# Patient Record
Sex: FEMALE | Race: BLACK OR AFRICAN AMERICAN | NOT HISPANIC OR LATINO
[De-identification: names, ages, dates, MRNs, and addresses within clinical notes are randomized per-mention and may not be internally consistent; named-entity substitution may affect disease eponyms.]

---

## 2017-08-31 ENCOUNTER — APPOINTMENT (OUTPATIENT)
Dept: HEART AND VASCULAR | Facility: CLINIC | Age: 72
End: 2017-08-31
Payer: MEDICARE

## 2017-08-31 VITALS
WEIGHT: 179.06 LBS | RESPIRATION RATE: 14 BRPM | HEIGHT: 69 IN | BODY MASS INDEX: 26.52 KG/M2 | DIASTOLIC BLOOD PRESSURE: 60 MMHG | HEART RATE: 63 BPM | OXYGEN SATURATION: 96 % | SYSTOLIC BLOOD PRESSURE: 132 MMHG | TEMPERATURE: 98.1 F

## 2017-08-31 PROCEDURE — 99214 OFFICE O/P EST MOD 30 MIN: CPT | Mod: 25

## 2017-08-31 PROCEDURE — 93000 ELECTROCARDIOGRAM COMPLETE: CPT

## 2017-09-11 ENCOUNTER — OUTPATIENT (OUTPATIENT)
Dept: OUTPATIENT SERVICES | Facility: HOSPITAL | Age: 72
LOS: 1 days | End: 2017-09-11
Payer: MEDICARE

## 2017-09-11 PROCEDURE — 75574 CT ANGIO HRT W/3D IMAGE: CPT

## 2017-09-11 PROCEDURE — 75574 CT ANGIO HRT W/3D IMAGE: CPT | Mod: 26

## 2017-09-26 ENCOUNTER — APPOINTMENT (OUTPATIENT)
Dept: HEART AND VASCULAR | Facility: CLINIC | Age: 72
End: 2017-09-26
Payer: MEDICARE

## 2017-09-26 VITALS
HEIGHT: 69 IN | SYSTOLIC BLOOD PRESSURE: 131 MMHG | RESPIRATION RATE: 14 BRPM | OXYGEN SATURATION: 90 % | BODY MASS INDEX: 24.88 KG/M2 | TEMPERATURE: 97.6 F | DIASTOLIC BLOOD PRESSURE: 60 MMHG | HEART RATE: 70 BPM | WEIGHT: 168 LBS

## 2017-09-26 DIAGNOSIS — R91.1 SOLITARY PULMONARY NODULE: ICD-10-CM

## 2017-09-26 DIAGNOSIS — E11.9 TYPE 2 DIABETES MELLITUS W/OUT COMPLICATIONS: ICD-10-CM

## 2017-09-26 PROCEDURE — 99214 OFFICE O/P EST MOD 30 MIN: CPT

## 2017-09-26 RX ORDER — ASPIRIN 81 MG/1
81 TABLET ORAL DAILY
Qty: 30 | Refills: 5 | Status: ACTIVE | COMMUNITY
Start: 2017-09-26

## 2017-09-26 RX ORDER — METFORMIN HYDROCHLORIDE 1000 MG/1
1000 TABLET, COATED ORAL TWICE DAILY
Qty: 60 | Refills: 3 | Status: ACTIVE | COMMUNITY
Start: 2017-04-13

## 2018-11-02 ENCOUNTER — APPOINTMENT (OUTPATIENT)
Dept: CT IMAGING | Facility: HOSPITAL | Age: 73
End: 2018-11-02
Payer: MEDICARE

## 2018-11-02 ENCOUNTER — OUTPATIENT (OUTPATIENT)
Dept: OUTPATIENT SERVICES | Facility: HOSPITAL | Age: 73
LOS: 1 days | End: 2018-11-02
Payer: MEDICARE

## 2018-11-02 PROCEDURE — 71250 CT THORAX DX C-: CPT | Mod: 26

## 2018-11-02 PROCEDURE — 71250 CT THORAX DX C-: CPT

## 2019-04-04 ENCOUNTER — APPOINTMENT (OUTPATIENT)
Dept: HEART AND VASCULAR | Facility: CLINIC | Age: 74
End: 2019-04-04

## 2019-04-04 ENCOUNTER — APPOINTMENT (OUTPATIENT)
Dept: HEART AND VASCULAR | Facility: CLINIC | Age: 74
End: 2019-04-04
Payer: MEDICARE

## 2019-04-04 VITALS
HEART RATE: 43 BPM | TEMPERATURE: 97.4 F | HEIGHT: 69 IN | RESPIRATION RATE: 14 BRPM | OXYGEN SATURATION: 97 % | BODY MASS INDEX: 26.36 KG/M2 | WEIGHT: 178 LBS

## 2019-04-04 DIAGNOSIS — I51.7 CARDIOMEGALY: ICD-10-CM

## 2019-04-04 PROCEDURE — 93000 ELECTROCARDIOGRAM COMPLETE: CPT

## 2019-04-04 PROCEDURE — 93306 TTE W/DOPPLER COMPLETE: CPT

## 2019-04-04 PROCEDURE — 99214 OFFICE O/P EST MOD 30 MIN: CPT

## 2019-04-05 PROBLEM — I51.7 LVH (LEFT VENTRICULAR HYPERTROPHY): Status: ACTIVE | Noted: 2019-04-05

## 2019-04-05 NOTE — DISCUSSION/SUMMARY
[FreeTextEntry1] : At the time of the patient's visit an Echocardiogram was performed to evaluate her LV function.\par \par  At the time of the visit the results were reviewed with patient \par \par I suggested that she return for a Nuclear Stress Test

## 2019-04-05 NOTE — HISTORY OF PRESENT ILLNESS
[FreeTextEntry1] : 74 year female who has been feeling fatigue, palpitations and chest pain. Her fatigue worsened over the past months. She needs to rest with household chores. She feels that she has always had fatigue but now feels more tired. She describes that when her baseline fatigue moves to being tired she then feels her heart fluttering, chest pain and shortness of breath which can last for 5-10 minutes and improves with time.

## 2019-04-24 ENCOUNTER — APPOINTMENT (OUTPATIENT)
Dept: HEART AND VASCULAR | Facility: CLINIC | Age: 74
End: 2019-04-24
Payer: MEDICARE

## 2019-04-24 PROCEDURE — 99213 OFFICE O/P EST LOW 20 MIN: CPT

## 2019-04-24 NOTE — HISTORY OF PRESENT ILLNESS
[FreeTextEntry1] : 74 year female who reports being unable to walk on a treadmill and is concerned about asthma with Lexiscan. At the time of her visit I contacted Dr Perez, confirmed findings of lung CT of November 2018 and Nuclear stress test was cancelled. Arrangements made for coronary CTA with calcium at [facility] to rule out CAD as the cause of her SOB

## 2019-04-24 NOTE — PHYSICAL EXAM
[General Appearance - Well Developed] : well developed [Normal Appearance] : normal appearance [Well Groomed] : well groomed [General Appearance - Well Nourished] : well nourished [No Deformities] : no deformities [General Appearance - In No Acute Distress] : no acute distress [Normal Conjunctiva] : the conjunctiva exhibited no abnormalities [] : no respiratory distress [Respiration, Rhythm And Depth] : normal respiratory rhythm and effort [Exaggerated Use Of Accessory Muscles For Inspiration] : no accessory muscle use [Abnormal Walk] : normal gait [Skin Turgor] : normal skin turgor [Oriented To Time, Place, And Person] : oriented to person, place, and time [Affect] : the affect was normal [Mood] : the mood was normal [No Anxiety] : not feeling anxious

## 2019-05-02 ENCOUNTER — APPOINTMENT (OUTPATIENT)
Dept: CT IMAGING | Facility: HOSPITAL | Age: 74
End: 2019-05-02
Payer: MEDICARE

## 2019-05-02 ENCOUNTER — OUTPATIENT (OUTPATIENT)
Dept: OUTPATIENT SERVICES | Facility: HOSPITAL | Age: 74
LOS: 1 days | End: 2019-05-02
Payer: MEDICARE

## 2019-05-02 PROCEDURE — 75574 CT ANGIO HRT W/3D IMAGE: CPT

## 2019-05-02 PROCEDURE — 75574 CT ANGIO HRT W/3D IMAGE: CPT | Mod: 26

## 2019-06-06 ENCOUNTER — APPOINTMENT (OUTPATIENT)
Dept: SLEEP CENTER | Facility: HOSPITAL | Age: 74
End: 2019-06-06

## 2019-06-06 ENCOUNTER — OUTPATIENT (OUTPATIENT)
Dept: OUTPATIENT SERVICES | Facility: HOSPITAL | Age: 74
LOS: 1 days | End: 2019-06-06
Payer: MEDICARE

## 2019-06-06 DIAGNOSIS — G47.33 OBSTRUCTIVE SLEEP APNEA (ADULT) (PEDIATRIC): ICD-10-CM

## 2019-06-06 PROCEDURE — 95811 POLYSOM 6/>YRS CPAP 4/> PARM: CPT | Mod: 26

## 2019-06-06 PROCEDURE — 95811 POLYSOM 6/>YRS CPAP 4/> PARM: CPT

## 2019-06-12 ENCOUNTER — APPOINTMENT (OUTPATIENT)
Dept: HEART AND VASCULAR | Facility: CLINIC | Age: 74
End: 2019-06-12

## 2019-06-18 ENCOUNTER — APPOINTMENT (OUTPATIENT)
Dept: HEART AND VASCULAR | Facility: CLINIC | Age: 74
End: 2019-06-18
Payer: MEDICARE

## 2019-06-18 ENCOUNTER — APPOINTMENT (OUTPATIENT)
Dept: HEART AND VASCULAR | Facility: CLINIC | Age: 74
End: 2019-06-18

## 2019-06-18 DIAGNOSIS — R00.1 BRADYCARDIA, UNSPECIFIED: ICD-10-CM

## 2019-06-18 PROCEDURE — 93225 XTRNL ECG REC<48 HRS REC: CPT

## 2019-06-18 RX ORDER — LOSARTAN POTASSIUM AND HYDROCHLOROTHIAZIDE 12.5; 1 MG/1; MG/1
100-12.5 TABLET ORAL
Refills: 0 | Status: DISCONTINUED | COMMUNITY
End: 2019-06-18

## 2019-06-18 NOTE — HISTORY OF PRESENT ILLNESS
[FreeTextEntry1] : 74 year female who was noted to have sinus bradycardia on EKG with Dr Ball comes for Holter hookup

## 2019-07-29 ENCOUNTER — APPOINTMENT (OUTPATIENT)
Dept: HEART AND VASCULAR | Facility: CLINIC | Age: 74
End: 2019-07-29
Payer: MEDICARE

## 2019-07-29 VITALS
DIASTOLIC BLOOD PRESSURE: 70 MMHG | TEMPERATURE: 97.2 F | SYSTOLIC BLOOD PRESSURE: 120 MMHG | RESPIRATION RATE: 14 BRPM | OXYGEN SATURATION: 98 % | HEART RATE: 50 BPM

## 2019-07-29 DIAGNOSIS — R00.2 PALPITATIONS: ICD-10-CM

## 2019-07-29 DIAGNOSIS — R42 DIZZINESS AND GIDDINESS: ICD-10-CM

## 2019-07-29 PROCEDURE — 99214 OFFICE O/P EST MOD 30 MIN: CPT

## 2019-07-29 NOTE — DISCUSSION/SUMMARY
[FreeTextEntry1] : Arrangements were made for EP consultation and 30 day event recorder with Dr Gongora at [facility]. If severe symptoms, then to go to ER at [facility]

## 2019-07-29 NOTE — PHYSICAL EXAM
[General Appearance - Well Developed] : well developed [General Appearance - Well Nourished] : well nourished [Well Groomed] : well groomed [Normal Appearance] : normal appearance [No Deformities] : no deformities [General Appearance - In No Acute Distress] : no acute distress [Normal Conjunctiva] : the conjunctiva exhibited no abnormalities [Respiration, Rhythm And Depth] : normal respiratory rhythm and effort [] : no respiratory distress [Exaggerated Use Of Accessory Muscles For Inspiration] : no accessory muscle use [Abnormal Walk] : normal gait [Skin Turgor] : normal skin turgor [Affect] : the affect was normal [Oriented To Time, Place, And Person] : oriented to person, place, and time [No Anxiety] : not feeling anxious [Mood] : the mood was normal

## 2019-07-29 NOTE — HISTORY OF PRESENT ILLNESS
[FreeTextEntry1] : 74 year female who notes a sense of feeling very dizzy as if she could pass out. She describes it as lightheaded and sometimes the room spinning. She recalls having some when wearing the Holter. She reports inner ear issues but not usually associated with dizziness

## 2019-08-12 ENCOUNTER — APPOINTMENT (OUTPATIENT)
Age: 74
End: 2019-08-12
Payer: MEDICARE

## 2019-08-12 PROCEDURE — 0298T: CPT

## 2019-09-09 ENCOUNTER — NON-APPOINTMENT (OUTPATIENT)
Age: 74
End: 2019-09-09

## 2019-09-09 ENCOUNTER — APPOINTMENT (OUTPATIENT)
Dept: HEART AND VASCULAR | Facility: CLINIC | Age: 74
End: 2019-09-09
Payer: MEDICARE

## 2019-09-09 VITALS
HEART RATE: 46 BPM | DIASTOLIC BLOOD PRESSURE: 79 MMHG | SYSTOLIC BLOOD PRESSURE: 184 MMHG | BODY MASS INDEX: 25.92 KG/M2 | HEIGHT: 69 IN | WEIGHT: 175 LBS

## 2019-09-09 PROCEDURE — 93000 ELECTROCARDIOGRAM COMPLETE: CPT

## 2019-09-09 PROCEDURE — 99204 OFFICE O/P NEW MOD 45 MIN: CPT | Mod: 25

## 2019-09-10 NOTE — DISCUSSION/SUMMARY
[FreeTextEntry1] : Ms. Perez is a pleasant 74 year-old with recent ambulatory telemetry significant for junctional bradycardia during waking hours (~ 40 bpm) and brief episodes of pSVT.  Given her complaint of fatigue and episodic dizziness, we discussed the utility of pacemaker placement for management of her bradycardia.  The risks, benefits and alternatives to the procedure were discussed with her in great detail.  She would like some time to discuss this with her family and will call me back to schedule the procedure.  All questions were answered to her apparent satisfaction.

## 2019-09-10 NOTE — PHYSICAL EXAM
[General Appearance - Well Developed] : well developed [Normal Appearance] : normal appearance [Well Groomed] : well groomed [General Appearance - Well Nourished] : well nourished [No Deformities] : no deformities [General Appearance - In No Acute Distress] : no acute distress [Eyelids - No Xanthelasma] : the eyelids demonstrated no xanthelasmas [Normal Conjunctiva] : the conjunctiva exhibited no abnormalities [Normal Oral Mucosa] : normal oral mucosa [No Oral Pallor] : no oral pallor [No Oral Cyanosis] : no oral cyanosis [Normal Jugular Venous V Waves Present] : normal jugular venous V waves present [Normal Jugular Venous A Waves Present] : normal jugular venous A waves present [No Jugular Venous Chen A Waves] : no jugular venous chen A waves [5th Left ICS - MCL] : palpated at the 5th LICS in the midclavicular line [Normal] : normal [Bradycardia] : bradycardic [Rhythm Regular] : regular [No Murmur] : no murmurs heard [No Pitting Edema] : no pitting edema present [Respiration, Rhythm And Depth] : normal respiratory rhythm and effort [Auscultation Breath Sounds / Voice Sounds] : lungs were clear to auscultation bilaterally [Exaggerated Use Of Accessory Muscles For Inspiration] : no accessory muscle use [Abdomen Soft] : soft [Abdomen Tenderness] : non-tender [Abdomen Mass (___ Cm)] : no abdominal mass palpated [Gait - Sufficient For Exercise Testing] : the gait was sufficient for exercise testing [Abnormal Walk] : normal gait [Cyanosis, Localized] : no localized cyanosis [Nail Clubbing] : no clubbing of the fingernails [Petechial Hemorrhages (___cm)] : no petechial hemorrhages [Skin Color & Pigmentation] : normal skin color and pigmentation [] : no rash [Skin Lesions] : no skin lesions [No Venous Stasis] : no venous stasis [No Xanthoma] : no  xanthoma was observed [No Skin Ulcers] : no skin ulcer [Oriented To Time, Place, And Person] : oriented to person, place, and time [Affect] : the affect was normal [Mood] : the mood was normal [No Anxiety] : not feeling anxious

## 2019-09-10 NOTE — HISTORY OF PRESENT ILLNESS
[FreeTextEntry1] : Ms. Perez is a pleasant 74 year-old female with a past medical history significant for KYLE (compliant with BIPAP), ILD (followed by Dr. Perez), DM, HLD, HTN who presents for initial evaluation of dizziness and bradycardia.  \par \par Describes episodic dizziness that she describes as the room spinning- lasts less than one minute.  \par Reports HR was noted to be in the 30 bpm range when she presented for CTA - states HR improved to about 50 bpm and CT was performed.  Reports generalized fatigue that has progressed over the last several years.  Denies any presyncope or syncope.  Exercises in her bed - does 100 sit ups morning and night.  Aware of brief palpitations that last for a few seconds, not particularly bothersome to her.\par \par CTA 5/2/19 - nonobstructive CAD, LVEF 65%\par LTM (Zio XT) 7/30 - 8/12/19 SR (33-49-96), pSVT - longest 16 beats @ 112 bpm, symptomatic events within 45 seconds of SVT and junctional bradycardia\par Holter 6/2019 SR (37-52-90), longest R-R 2 seconds (after PAC), isolated PVCs, PACs\par ECHO 4/2019 LVEF 65%, mild LVH, trace to mild MR, LA 4.1 cm

## 2019-10-08 ENCOUNTER — OUTPATIENT (OUTPATIENT)
Dept: OUTPATIENT SERVICES | Facility: HOSPITAL | Age: 74
LOS: 1 days | Discharge: HOME | End: 2019-10-08
Payer: MEDICARE

## 2019-10-08 ENCOUNTER — FORM ENCOUNTER (OUTPATIENT)
Age: 74
End: 2019-10-08

## 2019-10-08 VITALS — HEIGHT: 69 IN | WEIGHT: 175.05 LBS

## 2019-10-08 DIAGNOSIS — Z98.890 OTHER SPECIFIED POSTPROCEDURAL STATES: Chronic | ICD-10-CM

## 2019-10-08 DIAGNOSIS — I10 ESSENTIAL (PRIMARY) HYPERTENSION: ICD-10-CM

## 2019-10-08 DIAGNOSIS — E11.9 TYPE 2 DIABETES MELLITUS WITHOUT COMPLICATIONS: ICD-10-CM

## 2019-10-08 DIAGNOSIS — R00.1 BRADYCARDIA, UNSPECIFIED: ICD-10-CM

## 2019-10-08 DIAGNOSIS — G47.33 OBSTRUCTIVE SLEEP APNEA (ADULT) (PEDIATRIC): ICD-10-CM

## 2019-10-08 LAB
GLUCOSE BLDC GLUCOMTR-MCNC: 143 MG/DL — HIGH (ref 70–99)
GLUCOSE BLDC GLUCOMTR-MCNC: 156 MG/DL — HIGH (ref 70–99)

## 2019-10-08 PROCEDURE — 33208 INSRT HEART PM ATRIAL & VENT: CPT | Mod: KX

## 2019-10-08 RX ORDER — DEXTROSE 50 % IN WATER 50 %
25 SYRINGE (ML) INTRAVENOUS ONCE
Refills: 0 | Status: DISCONTINUED | OUTPATIENT
Start: 2019-10-08 | End: 2019-10-09

## 2019-10-08 RX ORDER — MONTELUKAST 4 MG/1
1 TABLET, CHEWABLE ORAL
Qty: 0 | Refills: 0 | DISCHARGE

## 2019-10-08 RX ORDER — METFORMIN HYDROCHLORIDE 850 MG/1
1 TABLET ORAL
Qty: 0 | Refills: 0 | DISCHARGE

## 2019-10-08 RX ORDER — MONTELUKAST 4 MG/1
10 TABLET, CHEWABLE ORAL DAILY
Refills: 0 | Status: DISCONTINUED | OUTPATIENT
Start: 2019-10-08 | End: 2019-10-09

## 2019-10-08 RX ORDER — DEXTROSE 50 % IN WATER 50 %
15 SYRINGE (ML) INTRAVENOUS ONCE
Refills: 0 | Status: DISCONTINUED | OUTPATIENT
Start: 2019-10-08 | End: 2019-10-09

## 2019-10-08 RX ORDER — ASPIRIN/CALCIUM CARB/MAGNESIUM 324 MG
81 TABLET ORAL DAILY
Refills: 0 | Status: DISCONTINUED | OUTPATIENT
Start: 2019-10-08 | End: 2019-10-09

## 2019-10-08 RX ORDER — SIMVASTATIN 20 MG/1
1 TABLET, FILM COATED ORAL
Qty: 0 | Refills: 0 | DISCHARGE

## 2019-10-08 RX ORDER — ACETAMINOPHEN 500 MG
650 TABLET ORAL EVERY 4 HOURS
Refills: 0 | Status: DISCONTINUED | OUTPATIENT
Start: 2019-10-08 | End: 2019-10-09

## 2019-10-08 RX ORDER — EPINEPHRINE 0.3 MG/.3ML
0 INJECTION INTRAMUSCULAR; SUBCUTANEOUS
Qty: 0 | Refills: 0 | DISCHARGE

## 2019-10-08 RX ORDER — GLUCAGON INJECTION, SOLUTION 0.5 MG/.1ML
1 INJECTION, SOLUTION SUBCUTANEOUS ONCE
Refills: 0 | Status: DISCONTINUED | OUTPATIENT
Start: 2019-10-08 | End: 2019-10-09

## 2019-10-08 RX ORDER — ALBUTEROL 90 UG/1
0 AEROSOL, METERED ORAL
Qty: 0 | Refills: 0 | DISCHARGE

## 2019-10-08 RX ORDER — CEFAZOLIN SODIUM 1 G
2000 VIAL (EA) INJECTION ONCE
Refills: 0 | Status: COMPLETED | OUTPATIENT
Start: 2019-10-08 | End: 2019-10-08

## 2019-10-08 RX ORDER — CEFAZOLIN SODIUM 1 G
VIAL (EA) INJECTION
Refills: 0 | Status: DISCONTINUED | OUTPATIENT
Start: 2019-10-08 | End: 2019-10-08

## 2019-10-08 RX ORDER — CEFAZOLIN SODIUM 1 G
2000 VIAL (EA) INJECTION EVERY 8 HOURS
Refills: 0 | Status: DISCONTINUED | OUTPATIENT
Start: 2019-10-08 | End: 2019-10-08

## 2019-10-08 RX ORDER — SODIUM CHLORIDE 9 MG/ML
1000 INJECTION, SOLUTION INTRAVENOUS
Refills: 0 | Status: DISCONTINUED | OUTPATIENT
Start: 2019-10-08 | End: 2019-10-09

## 2019-10-08 RX ORDER — UMECLIDINIUM 62.5 UG/1
62.5 AEROSOL, POWDER ORAL
Qty: 0 | Refills: 0 | DISCHARGE

## 2019-10-08 RX ORDER — SIMVASTATIN 20 MG/1
40 TABLET, FILM COATED ORAL DAILY
Refills: 0 | Status: DISCONTINUED | OUTPATIENT
Start: 2019-10-08 | End: 2019-10-09

## 2019-10-08 RX ORDER — CEFAZOLIN SODIUM 1 G
2000 VIAL (EA) INJECTION EVERY 8 HOURS
Refills: 0 | Status: COMPLETED | OUTPATIENT
Start: 2019-10-08 | End: 2019-10-09

## 2019-10-08 RX ORDER — ALBUTEROL 90 UG/1
2 AEROSOL, METERED ORAL EVERY 4 HOURS
Refills: 0 | Status: DISCONTINUED | OUTPATIENT
Start: 2019-10-08 | End: 2019-10-09

## 2019-10-08 RX ORDER — INSULIN LISPRO 100/ML
VIAL (ML) SUBCUTANEOUS ONCE
Refills: 0 | Status: COMPLETED | OUTPATIENT
Start: 2019-10-08 | End: 2019-10-08

## 2019-10-08 RX ORDER — LOSARTAN POTASSIUM 100 MG/1
1 TABLET, FILM COATED ORAL
Qty: 0 | Refills: 0 | DISCHARGE

## 2019-10-08 RX ORDER — ASPIRIN/CALCIUM CARB/MAGNESIUM 324 MG
1 TABLET ORAL
Qty: 0 | Refills: 0 | DISCHARGE

## 2019-10-08 RX ORDER — LOSARTAN POTASSIUM 100 MG/1
50 TABLET, FILM COATED ORAL DAILY
Refills: 0 | Status: DISCONTINUED | OUTPATIENT
Start: 2019-10-08 | End: 2019-10-09

## 2019-10-08 RX ORDER — DEXTROSE 50 % IN WATER 50 %
12.5 SYRINGE (ML) INTRAVENOUS ONCE
Refills: 0 | Status: DISCONTINUED | OUTPATIENT
Start: 2019-10-08 | End: 2019-10-09

## 2019-10-08 RX ADMIN — Medication 2000 MILLIGRAM(S): at 09:10

## 2019-10-08 RX ADMIN — Medication 2000 MILLIGRAM(S): at 22:20

## 2019-10-08 RX ADMIN — LOSARTAN POTASSIUM 50 MILLIGRAM(S): 100 TABLET, FILM COATED ORAL at 18:57

## 2019-10-08 RX ADMIN — SIMVASTATIN 40 MILLIGRAM(S): 20 TABLET, FILM COATED ORAL at 22:20

## 2019-10-08 RX ADMIN — MONTELUKAST 10 MILLIGRAM(S): 4 TABLET, CHEWABLE ORAL at 22:20

## 2019-10-08 NOTE — H&P ADULT - HISTORY OF PRESENT ILLNESS
74 year-old female with a past medical history significant for KYLE (compliant with BIPAP), ILD (followed by Dr. Perez), DM, HLD, HTN, outpatient telemetry monitoring was significant for junctional bradycardia correlating with her symptoms of dizziness and fatigue who presents for pacemaker implantation.       Describes episodic dizziness that she describes as the room spinning- lasts less than one minute.    Reports HR was noted to be in the 30 bpm range when she presented for CTA - states HR improved to about 50 bpm and CT was performed.  Reports generalized fatigue that has progressed over the last several years.  Denies any presyncope or syncope.  Exercises in her bed - does 100 sit ups morning and night.  Aware of brief palpitations that last for a few seconds, not particularly bothersome to her.

## 2019-10-08 NOTE — H&P ADULT - ASSESSMENT
74 year-old female with a past medical history significant for KYLE (compliant with BIPAP), ILD (followed by Dr. Perez), DM, HLD, HTN, outpatient telemetry monitoring was significant for junctional bradycardia correlating with her symptoms of dizziness and fatigue who presents for pacemaker implantation.

## 2019-10-08 NOTE — H&P ADULT - NSICDXPASTMEDICALHX_GEN_ALL_CORE_FT
PAST MEDICAL HISTORY:  Anemia s/p blood transfusion    Essential hypertension HTN (hypertension)    Hyperlipidemia Hyperlipidemia    KYLE treated with BiPAP     Type 2 diabetes mellitus Diabetes

## 2019-10-08 NOTE — PROGRESS NOTE ADULT - SUBJECTIVE AND OBJECTIVE BOX
SHAILA LARA  [id number]    PROCEDURE:  Dual chamber PPM          INDICATION:  SSS        ELECTROPHYSIOLOGIST(S):  MD Rosa Gongora MD        FINDINGS:  - Successful dual chamber PPM placement      COMPLICATIONS:  None      RECOMMENDATIONS:  - Ancef x2 doses q8hrs  - CXR in the AM

## 2019-10-08 NOTE — H&P ADULT - NSICDXPASTSURGICALHX_GEN_ALL_CORE_FT
PAST SURGICAL HISTORY:  History of brain surgery     History of breast lump removal     Other postprocedural status S/P knee surgery    Status post total hysterectomy S/P hysterectomy

## 2019-10-09 ENCOUNTER — TRANSCRIPTION ENCOUNTER (OUTPATIENT)
Age: 74
End: 2019-10-09

## 2019-10-09 VITALS
RESPIRATION RATE: 18 BRPM | OXYGEN SATURATION: 98 % | DIASTOLIC BLOOD PRESSURE: 71 MMHG | SYSTOLIC BLOOD PRESSURE: 146 MMHG | HEART RATE: 64 BPM

## 2019-10-09 LAB — GLUCOSE BLDC GLUCOMTR-MCNC: 197 MG/DL — HIGH (ref 70–99)

## 2019-10-09 PROCEDURE — 71046 X-RAY EXAM CHEST 2 VIEWS: CPT

## 2019-10-09 PROCEDURE — 82962 GLUCOSE BLOOD TEST: CPT

## 2019-10-09 PROCEDURE — 71046 X-RAY EXAM CHEST 2 VIEWS: CPT | Mod: 26

## 2019-10-09 PROCEDURE — C1785: CPT

## 2019-10-09 PROCEDURE — 33208 INSRT HEART PM ATRIAL & VENT: CPT | Mod: KX

## 2019-10-09 PROCEDURE — C1894: CPT

## 2019-10-09 PROCEDURE — C1769: CPT

## 2019-10-09 PROCEDURE — C1898: CPT

## 2019-10-09 RX ORDER — INSULIN LISPRO 100/ML
VIAL (ML) SUBCUTANEOUS ONCE
Refills: 0 | Status: COMPLETED | OUTPATIENT
Start: 2019-10-09 | End: 2019-10-09

## 2019-10-09 RX ADMIN — Medication 1: at 12:48

## 2019-10-09 RX ADMIN — Medication 81 MILLIGRAM(S): at 12:18

## 2019-10-09 RX ADMIN — MONTELUKAST 10 MILLIGRAM(S): 4 TABLET, CHEWABLE ORAL at 12:18

## 2019-10-09 RX ADMIN — Medication 2000 MILLIGRAM(S): at 06:22

## 2019-10-09 RX ADMIN — SIMVASTATIN 40 MILLIGRAM(S): 20 TABLET, FILM COATED ORAL at 12:18

## 2019-10-09 RX ADMIN — LOSARTAN POTASSIUM 50 MILLIGRAM(S): 100 TABLET, FILM COATED ORAL at 06:22

## 2019-10-09 NOTE — DISCHARGE NOTE PROVIDER - NSDCCPTREATMENT_GEN_ALL_CORE_FT
PRINCIPAL PROCEDURE  Procedure: Insertion of dual chamber permanent pacemaker  Findings and Treatment:

## 2019-10-09 NOTE — DISCHARGE NOTE PROVIDER - NSDCFUADDINST_GEN_ALL_CORE_FT
Cardiac Device Implant Post Operative Instructions  - Do not touch the incision until it is completely healed.   - There is a glue dressing  covering your incision, which will start to peel off on its own over the next 2-3 weeks. Do not pick at or peel off the glue dressing.   - Bruising around the implant site or over the chest, side or arm near the incision is normal, and will take a few weeks to resolve.  -Do not lift the affected arm higher than 90 degrees (shoulder height) in any direction for 4 weeks.   - Do not push, pull or lift anything heavier than 10 lbs (about a gallon of milk) with the affected arm for 4 weeks.     - Do not apply soaps, creams, lotions, ointments or powders to the incision until it is completely healed.  - You may take a shower in 24 hours, and allow the water to run over the incision. However, do not submerge the incision in water: do not swim or soak in bath tubs, hot tubs, swimming pools, etc.   You should call the doctor if:   - You notice redness, drainage, swelling, increased tenderness, hot sensation around the incision, bleeding or incision edges pulling apart.  - Your temperature is greater than 100 degrees F for more than 24 hours.  - You notice swelling or bulging at the incision or around the device that was not there when you left the hospital or is increasing in size.  - You experience increased difficulty breathing.  - You notice new/worsening swelling in your legs and ankles.  - You faint or have dizzy spells.  - You have any questions or concerns regarding your device or the procedure.

## 2019-10-09 NOTE — DISCHARGE NOTE PROVIDER - NSDCCPCAREPLAN_GEN_ALL_CORE_FT
PRINCIPAL DISCHARGE DIAGNOSIS  Diagnosis: Symptomatic bradycardia  Assessment and Plan of Treatment:

## 2019-10-09 NOTE — PROGRESS NOTE ADULT - SUBJECTIVE AND OBJECTIVE BOX
EPS Device interrogation    Indication: post implantation    Device model: [employer] Edora 8 DR-T					    Implanting Physician: Wilber Gongora    Functioning Mode: DDD-CLS 60/130			    Underlying Rhythm: AP/VS    Pacemaker dependency: No    Battery status: MARLYN    Lead parameters:   				  RA lead:    Sense:      5.0 mV.              Threshold:      0.3 V at    0.5 ms.           Impedance:      526 ohms  RV lead:    Sense:      12.5 mV.              Threshold:      0.4 V at    0.5 ms.           Impedance:       643 ohms    Events/Alert: none    Parameter change: none	        Will discuss with EPS attending.

## 2019-10-09 NOTE — DISCHARGE NOTE NURSING/CASE MANAGEMENT/SOCIAL WORK - PATIENT PORTAL LINK FT
You can access the FollowMyHealth Patient Portal offered by [facility] by registering at the following website: [email or web address]. By joining [employer]’s FollowMyHealth portal, you will also be able to view your health information using other applications (apps) compatible with our system.

## 2019-10-09 NOTE — DISCHARGE NOTE PROVIDER - HOSPITAL COURSE
74 year-old female with a past medical history significant for KYLE (compliant with BIPAP), ILD (followed by Dr. Perez), DM, HLD, HTN, outpatient telemetry monitoring was significant for junctional bradycardia correlating with her symptoms of dizziness and fatigue presented for pacemaker implantation.       On Oct 8, 2019, pt underwent successful dual chamber PPM placement.    Post procedure course was uneventful.        Pt with mild complaints of incisional pain. Pt reports relief with tylenol. No other complaints.        Constitutional: No acute Distress    Psych: Normal affect, normal mood    Neuro: A/o x 3, No focal deficits    Neck: Supple, NO JVD    CVS: S1 S2, No M/R/G    Pulmonary: CTAB, Breathing unlabored, No Rhonchi/Rales/Wheezing    GI: Soft, Non -tender, +BS x 4 quads    Skin: No rash, warm and dry, no erythematous areas     MSK: 5/5 strength, normal range of motion, no swollen or erythematous    joints.        Initial dressing removed. Incision is well approximated, clean and dry. Dermabond intact. Mild erythema. Mild ecchymosis. No oozing, bleeding, or hematoma.    Device check - parameters within desired limits.    CXR, by my read, leads in good position, no ptx.

## 2019-10-09 NOTE — DISCHARGE NOTE PROVIDER - CARE PROVIDER_API CALL
Wilber Gongora)  Cardiac Electrophysiology; Cardiovascular Disease; Internal Medicine  [address] [place] [address]  Phone: [phone]  Fax: [phone]  Follow Up Time:

## 2019-11-11 ENCOUNTER — APPOINTMENT (OUTPATIENT)
Dept: HEART AND VASCULAR | Facility: CLINIC | Age: 74
End: 2019-11-11
Payer: MEDICARE

## 2019-11-11 VITALS
HEART RATE: 80 BPM | HEIGHT: 69 IN | SYSTOLIC BLOOD PRESSURE: 167 MMHG | WEIGHT: 171 LBS | DIASTOLIC BLOOD PRESSURE: 70 MMHG | BODY MASS INDEX: 25.33 KG/M2

## 2019-11-11 PROBLEM — G47.33 OBSTRUCTIVE SLEEP APNEA (ADULT) (PEDIATRIC): Chronic | Status: ACTIVE | Noted: 2019-10-08

## 2019-11-11 PROCEDURE — 93280 PM DEVICE PROGR EVAL DUAL: CPT

## 2019-11-11 PROCEDURE — 99024 POSTOP FOLLOW-UP VISIT: CPT

## 2019-11-11 NOTE — PHYSICAL EXAM
[General Appearance - Well Developed] : well developed [Normal Appearance] : normal appearance [Well Groomed] : well groomed [General Appearance - Well Nourished] : well nourished [No Deformities] : no deformities [General Appearance - In No Acute Distress] : no acute distress [Respiration, Rhythm And Depth] : normal respiratory rhythm and effort [Exaggerated Use Of Accessory Muscles For Inspiration] : no accessory muscle use [Abdomen Soft] : soft [Auscultation Breath Sounds / Voice Sounds] : lungs were clear to auscultation bilaterally [Abdomen Tenderness] : non-tender [Abdomen Mass (___ Cm)] : no abdominal mass palpated [Cyanosis, Localized] : no localized cyanosis [Nail Clubbing] : no clubbing of the fingernails [Petechial Hemorrhages (___cm)] : no petechial hemorrhages [Eyelids - No Xanthelasma] : the eyelids demonstrated no xanthelasmas [Normal Conjunctiva] : the conjunctiva exhibited no abnormalities [Normal Oral Mucosa] : normal oral mucosa [No Oral Pallor] : no oral pallor [Normal Jugular Venous A Waves Present] : normal jugular venous A waves present [No Oral Cyanosis] : no oral cyanosis [No Jugular Venous Chen A Waves] : no jugular venous chen A waves [5th Left ICS - MCL] : palpated at the 5th LICS in the midclavicular line [Normal Jugular Venous V Waves Present] : normal jugular venous V waves present [Rhythm Regular] : regular [Bradycardia] : bradycardic [Normal] : normal [No Murmur] : no murmurs heard [No Pitting Edema] : no pitting edema present [Abnormal Walk] : normal gait [Gait - Sufficient For Exercise Testing] : the gait was sufficient for exercise testing [Skin Color & Pigmentation] : normal skin color and pigmentation [] : no rash [No Venous Stasis] : no venous stasis [Skin Lesions] : no skin lesions [No Skin Ulcers] : no skin ulcer [No Xanthoma] : no  xanthoma was observed [Oriented To Time, Place, And Person] : oriented to person, place, and time [Affect] : the affect was normal [Mood] : the mood was normal [No Anxiety] : not feeling anxious

## 2019-11-15 NOTE — PROCEDURE
[No] : not [Pacemaker] : pacemaker [Sinus Bradycardia] : sinus bradycardia [DDD] : DDD [Normal] : The battery status is normal. [Lead Imp:  ___ohms] : lead impedance was [unfilled] ohms [Sensing Amplitude ___mv] : sensing amplitude was [unfilled] mv [___V @] : [unfilled] V [___ ms] : [unfilled] ms [Programmed for Longevity] : output reprogrammed for improved battery longevity [de-identified] : [place] [de-identified] : CLS [de-identified] : 10/8/19 [de-identified] : 60

## 2019-11-15 NOTE — HISTORY OF PRESENT ILLNESS
[FreeTextEntry1] : Ms. Perez is a pleasant 74 year-old female with a past medical history significant for KYLE (compliant with BIPAP), ILD (followed by Dr. 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  She presents for post procedure follow-up today.  \par \par She denies any recurrent dizziness post procedure.  She offers no acute complaints.  \par  Denies any presyncope or syncope.  Exercises in her bed - does 100 sit ups morning and night.  Aware of brief palpitations that last for a few seconds, not particularly bothersome to her.\par \par CTA 5/2/19 - nonobstructive CAD, LVEF 65%\par LTM (Zio XT) 7/30 - 8/12/19 SR (33-49-96), pSVT - longest 16 beats @ 112 bpm, symptomatic events within 45 seconds of SVT and junctional bradycardia\par Holter 6/2019 SR (37-52-90), longest R-R 2 seconds (after PAC), isolated PVCs, PACs\par ECHO 4/2019 LVEF 65%, mild LVH, trace to mild MR, LA 4.1 cm

## 2019-11-15 NOTE — DISCUSSION/SUMMARY
[FreeTextEntry1] : Ms. Perez is a pleasant 74 year-old with recent ambulatory telemetry significant for junctional bradycardia during waking hours (~ 40 bpm) and brief episodes of pSVT.  Now s/p PPM placement 10/8/19.  Her device is functioning appropriately as programmed and all measured data is WNL.  She is enrolled in remote monitoring and was asked to return for follow-up in six months.

## 2020-01-02 ENCOUNTER — APPOINTMENT (OUTPATIENT)
Dept: HEART AND VASCULAR | Facility: CLINIC | Age: 75
End: 2020-01-02
Payer: MEDICARE

## 2020-01-02 VITALS
HEIGHT: 69 IN | HEART RATE: 79 BPM | OXYGEN SATURATION: 98 % | SYSTOLIC BLOOD PRESSURE: 154 MMHG | BODY MASS INDEX: 26.66 KG/M2 | DIASTOLIC BLOOD PRESSURE: 72 MMHG | WEIGHT: 180 LBS | TEMPERATURE: 97.2 F | RESPIRATION RATE: 14 BRPM

## 2020-01-02 PROCEDURE — 93306 TTE W/DOPPLER COMPLETE: CPT

## 2020-01-02 PROCEDURE — 93000 ELECTROCARDIOGRAM COMPLETE: CPT

## 2020-01-02 PROCEDURE — 99214 OFFICE O/P EST MOD 30 MIN: CPT

## 2020-01-02 NOTE — DISCUSSION/SUMMARY
[FreeTextEntry1] : At the time of the patient's visit an Echocardiogram was performed to evaluate LV function. At the time of the visit the results were reviewed with patient \par \par Resume Cardizem ER at 120 mg daily and return in 4 weeks

## 2020-01-02 NOTE — HISTORY OF PRESENT ILLNESS
[FreeTextEntry1] : 74 year female who notes intermittent palpitations and chest pain that can occur at any time. She notes fatigue when making her bed. She is aware of an elevated BP starting prior to her PPM.

## 2020-01-02 NOTE — PHYSICAL EXAM
[General Appearance - Well Developed] : well developed [Normal Appearance] : normal appearance [Well Groomed] : well groomed [General Appearance - Well Nourished] : well nourished [No Deformities] : no deformities [Normal Conjunctiva] : the conjunctiva exhibited no abnormalities [General Appearance - In No Acute Distress] : no acute distress [Respiration, Rhythm And Depth] : normal respiratory rhythm and effort [] : no respiratory distress [Heart Sounds] : normal S1 and S2 [Auscultation Breath Sounds / Voice Sounds] : lungs were clear to auscultation bilaterally [Exaggerated Use Of Accessory Muscles For Inspiration] : no accessory muscle use [FreeTextEntry1] : no edema [Skin Turgor] : normal skin turgor [Abnormal Walk] : normal gait [Oriented To Time, Place, And Person] : oriented to person, place, and time [Affect] : the affect was normal [Mood] : the mood was normal [No Anxiety] : not feeling anxious

## 2020-01-31 ENCOUNTER — APPOINTMENT (OUTPATIENT)
Dept: HEART AND VASCULAR | Facility: CLINIC | Age: 75
End: 2020-01-31

## 2020-02-05 ENCOUNTER — APPOINTMENT (OUTPATIENT)
Dept: HEART AND VASCULAR | Facility: CLINIC | Age: 75
End: 2020-02-05
Payer: MEDICARE

## 2020-02-05 VITALS — WEIGHT: 175.05 LBS | HEIGHT: 69 IN

## 2020-02-05 VITALS
DIASTOLIC BLOOD PRESSURE: 76 MMHG | TEMPERATURE: 97.3 F | HEART RATE: 77 BPM | WEIGHT: 175 LBS | BODY MASS INDEX: 25.92 KG/M2 | HEIGHT: 69 IN | SYSTOLIC BLOOD PRESSURE: 150 MMHG | RESPIRATION RATE: 14 BRPM

## 2020-02-05 VITALS — SYSTOLIC BLOOD PRESSURE: 154 MMHG | DIASTOLIC BLOOD PRESSURE: 70 MMHG

## 2020-02-05 DIAGNOSIS — R07.89 OTHER CHEST PAIN: ICD-10-CM

## 2020-02-05 PROCEDURE — 93000 ELECTROCARDIOGRAM COMPLETE: CPT

## 2020-02-05 PROCEDURE — 99214 OFFICE O/P EST MOD 30 MIN: CPT

## 2020-02-05 PROCEDURE — 36415 COLL VENOUS BLD VENIPUNCTURE: CPT

## 2020-02-05 NOTE — H&P ADULT - NSICDXPASTMEDICALHX_GEN_ALL_CORE_FT
PAST MEDICAL HISTORY:  Anemia s/p blood transfusion; per patient resolved after B12 injections    Brain cancer     Cataract     Essential hypertension HTN (hypertension)    History of bradycardia s/p ppm    Hyperlipidemia Hyperlipidemia    NSVT (nonsustained ventricular tachycardia)     KYLE treated with BiPAP     Seizure disorder     Type 2 diabetes mellitus Diabetes PAST MEDICAL HISTORY:  Anemia s/p blood transfusion; per patient resolved after B12 injections    Asthma with COPD     Brain cancer     Cataract     Essential hypertension HTN (hypertension)    History of bradycardia s/p ppm    Hyperlipidemia Hyperlipidemia    Interstitial lung disease     NSVT (nonsustained ventricular tachycardia)     KYLE treated with BiPAP     Seizure disorder     Type 2 diabetes mellitus Diabetes

## 2020-02-05 NOTE — HISTORY OF PRESENT ILLNESS
[FreeTextEntry1] : 75 year female who continues to have SOB and chest pain. She reports seeing Pulmonary who felt that her pain was not explained by her lung disease. She had chest pain this morning with minimal activity. She has had pain when making her bed, walking up stairs or exercising. Her most severe episode was Monday.

## 2020-02-05 NOTE — H&P ADULT - RS GEN PE MLT RESP DETAILS PC
respirations non-labored/good air movement/airway patent/breath sounds equal/no rhonchi/no wheezes/no intercostal retractions/no rales

## 2020-02-05 NOTE — H&P ADULT - HISTORY OF PRESENT ILLNESS
Confirm meds - steroids sent to pharmacy    74 year-old female [employer] with a PMH significant for DM, HLD, HTN, KYLE (compliant with BiPAP with O2 - unknown quantity), asthma/COPD (only on O2 at night, has been to ER for asthma - no hospitalizations/intubation), ILD (followed by Dr. 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 Lehman with complaints of SOB and substernal "tight" 10/10 chest pain with minimal exertion (making her bed - had to stop and rest), and in the shower, dizziness/pre-syncopal, fatigue. She has had rest pain. She reports seeing Pulm who felt that her pain was not explained by her lung disease. Denies any palpitations, syncope, LE edema, orthopnea, PND, fever, chills, or melena. CCTA 5/2/19: Ca score 140 (LAD 65, LCX 60, RCA 15, LM 0) - 66 percentile DELVALLE; Minimal stneosis in the distal LM, pLAD, pLCX, mLCX, and p/mRCA, EF 65%. Per MD note, ECHO 4/2019 LVEF 65%, mild LVH, trace to mild MR, LA 4.1 cm.     Due to patient's risk factors, known CAD, and ccs class IV anginal symptoms, patient now presents to [facility] for Cardiac Catheterization with PCI as clinically indicated.      Cardiac cath 1/15/15: LMCA nl, LAD luminal irr, LCX nl, RCA nl, EF 60%, EDP 10. 74 year-old female [employer] with a PMH significant for DM, HLD, HTN, KYLE (compliant with BiPAP with O2 - unknown quantity), asthma/COPD (only on O2 at night, has been to ER for asthma - no hospitalizations/intubation), ILD (followed by Dr. 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 Lehman with complaints of SOB and substernal "tight" 10/10 chest pain with minimal exertion (making her bed - had to stop and rest), and in the shower, dizziness/pre-syncopal, fatigue. She has had rest pain. She reports seeing Pulm who felt that her pain was not explained by her lung disease. Denies any palpitations, syncope, LE edema, orthopnea, PND, fever, chills, or melena. CCTA 5/2/19: Ca score 140 (LAD 65, LCX 60, RCA 15, LM 0) - 66 percentile DELVALLE; Minimal stneosis in the distal LM, pLAD, pLCX, mLCX, and p/mRCA, EF 65%. Per MD note, ECHO 4/2019 LVEF 65%, mild LVH, trace to mild MR, LA 4.1 cm.     Due to patient's risk factors, known CAD, and ccs class IV anginal symptoms, patient now presents to [facility] for Cardiac Catheterization with PCI as clinically indicated.      Cardiac cath 1/15/15: LMCA nl, LAD luminal irr, LCX nl, RCA nl, EF 60%, EDP 10.

## 2020-02-05 NOTE — DISCUSSION/SUMMARY
[FreeTextEntry1] : DM, Chest pain and SOB--Arrangements were made for cardiac cath at [facility] tomorrow. If worsening of symptoms to go to ER

## 2020-02-05 NOTE — H&P ADULT - NSHPLABSRESULTS_GEN_ALL_CORE
13.0   8.22  )-----------( 342      ( 06 Feb 2020 07:38 )             40.1               PT/INR - ( 06 Feb 2020 07:38 )   PT: 10.6 sec;   INR: 0.93          PTT - ( 06 Feb 2020 07:38 )  PTT:31.9 sec              EKG:

## 2020-02-05 NOTE — H&P ADULT - ASSESSMENT
74 year-old female [employer] with a PMH significant for DM, HLD, HTN, KYLE (compliant with BiPAP with O2 - unknown quantity), asthma/COPD (only on O2 at night, has been to ER for asthma - no hospitalizations/intubation), ILD (followed by Dr. 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 for recommended cardiac catheterization with possible intervention if clinically indicated.    -ASA 3, Mallampati 3  -Pt compliant with home ASA 81mg, last dose this AM. Will load pt with Plavix 600mg prior to cath  -IVF Hydration NS @ 75cc/hr  -Given seafood allergy, pt took Prednisone 60mg PO at 6PM, 12AM and 6AM prior to cath, will give Benadryl 50mg IVP X 1 on call to cath lab table  -precath consented  -Risks & benefits of procedure and alternative therapy have been explained to the patient including but not limited to: allergic reaction, bleeding w/possible need for blood transfusion, infection, renal and vascular compromise, limb damage, arrhythmia, stroke, vessel dissection/perforation, Myocardial infarction, emergent CABG. Informed consent obtained and in chart.

## 2020-02-05 NOTE — PHYSICAL EXAM
[General Appearance - Well Developed] : well developed [Normal Appearance] : normal appearance [Well Groomed] : well groomed [General Appearance - Well Nourished] : well nourished [No Deformities] : no deformities [General Appearance - In No Acute Distress] : no acute distress [Normal Conjunctiva] : the conjunctiva exhibited no abnormalities [] : no respiratory distress [Respiration, Rhythm And Depth] : normal respiratory rhythm and effort [Exaggerated Use Of Accessory Muscles For Inspiration] : no accessory muscle use [Auscultation Breath Sounds / Voice Sounds] : lungs were clear to auscultation bilaterally [Heart Sounds] : normal S1 and S2 [Abdomen Soft] : soft [Abnormal Walk] : normal gait [FreeTextEntry1] : no edema [Oriented To Time, Place, And Person] : oriented to person, place, and time [Skin Turgor] : normal skin turgor [Mood] : the mood was normal [Affect] : the affect was normal [No Anxiety] : not feeling anxious

## 2020-02-05 NOTE — H&P ADULT - NSICDXPASTSURGICALHX_GEN_ALL_CORE_FT
PAST SURGICAL HISTORY:  History of brain surgery     History of breast lump removal     Other postprocedural status S/P knee surgery    Status post total hysterectomy S/P hysterectomy PAST SURGICAL HISTORY:  History of brain surgery 1997    History of breast lump removal b/l    Other postprocedural status S/P knee surgery    S/P placement of cardiac pacemaker 10/2018, dual chamber biotronik    Status post total hysterectomy S/P hysterectomy

## 2020-02-06 ENCOUNTER — OUTPATIENT (OUTPATIENT)
Dept: OUTPATIENT SERVICES | Facility: HOSPITAL | Age: 75
LOS: 1 days | Discharge: NURSING, REHAB OR LONG-TERM CARE | End: 2020-02-06
Payer: MEDICARE

## 2020-02-06 DIAGNOSIS — Z98.890 OTHER SPECIFIED POSTPROCEDURAL STATES: Chronic | ICD-10-CM

## 2020-02-06 DIAGNOSIS — Z95.0 PRESENCE OF CARDIAC PACEMAKER: Chronic | ICD-10-CM

## 2020-02-06 LAB
ALBUMIN SERPL ELPH-MCNC: 4.3 G/DL
ALBUMIN SERPL ELPH-MCNC: 4.3 G/DL — SIGNIFICANT CHANGE UP (ref 3.3–5)
ALP BLD-CCNC: 92 U/L
ALP SERPL-CCNC: 97 U/L — SIGNIFICANT CHANGE UP (ref 40–120)
ALT FLD-CCNC: 14 U/L — SIGNIFICANT CHANGE UP (ref 10–45)
ALT SERPL-CCNC: 13 U/L
ANION GAP SERPL CALC-SCNC: 12 MMOL/L
ANION GAP SERPL CALC-SCNC: 16 MMOL/L — SIGNIFICANT CHANGE UP (ref 5–17)
APTT BLD: 31.7 SEC
APTT BLD: 31.9 SEC — SIGNIFICANT CHANGE UP (ref 27.5–36.3)
AST SERPL-CCNC: 16 U/L
AST SERPL-CCNC: 19 U/L — SIGNIFICANT CHANGE UP (ref 10–40)
BASOPHILS # BLD AUTO: 0.01 K/UL — SIGNIFICANT CHANGE UP (ref 0–0.2)
BASOPHILS # BLD AUTO: 0.11 K/UL
BASOPHILS NFR BLD AUTO: 0.1 % — SIGNIFICANT CHANGE UP (ref 0–2)
BASOPHILS NFR BLD AUTO: 1.8 %
BILIRUB SERPL-MCNC: 0.3 MG/DL
BILIRUB SERPL-MCNC: 0.3 MG/DL — SIGNIFICANT CHANGE UP (ref 0.2–1.2)
BUN SERPL-MCNC: 13 MG/DL
BUN SERPL-MCNC: 14 MG/DL — SIGNIFICANT CHANGE UP (ref 7–23)
CALCIUM SERPL-MCNC: 10.2 MG/DL
CALCIUM SERPL-MCNC: 10.4 MG/DL — SIGNIFICANT CHANGE UP (ref 8.4–10.5)
CHLORIDE SERPL-SCNC: 102 MMOL/L
CHLORIDE SERPL-SCNC: 102 MMOL/L — SIGNIFICANT CHANGE UP (ref 96–108)
CHOLEST SERPL-MCNC: 201 MG/DL — HIGH (ref 10–199)
CK MB CFR SERPL CALC: 1.9 NG/ML — SIGNIFICANT CHANGE UP (ref 0–6.7)
CK SERPL-CCNC: 84 U/L — SIGNIFICANT CHANGE UP (ref 25–170)
CO2 SERPL-SCNC: 21 MMOL/L — LOW (ref 22–31)
CO2 SERPL-SCNC: 27 MMOL/L
CREAT SERPL-MCNC: 0.75 MG/DL — SIGNIFICANT CHANGE UP (ref 0.5–1.3)
CREAT SERPL-MCNC: 0.8 MG/DL
EOSINOPHIL # BLD AUTO: 0 K/UL — SIGNIFICANT CHANGE UP (ref 0–0.5)
EOSINOPHIL # BLD AUTO: 0.09 K/UL
EOSINOPHIL NFR BLD AUTO: 0 % — SIGNIFICANT CHANGE UP (ref 0–6)
EOSINOPHIL NFR BLD AUTO: 1.5 %
GLUCOSE BLDC GLUCOMTR-MCNC: 179 MG/DL — HIGH (ref 70–99)
GLUCOSE BLDC GLUCOMTR-MCNC: 213 MG/DL — HIGH (ref 70–99)
GLUCOSE SERPL-MCNC: 118 MG/DL
GLUCOSE SERPL-MCNC: 243 MG/DL — HIGH (ref 70–99)
HBA1C BLD-MCNC: 7.4 % — HIGH (ref 4–5.6)
HCT VFR BLD CALC: 40 %
HCT VFR BLD CALC: 40.1 % — SIGNIFICANT CHANGE UP (ref 34.5–45)
HDLC SERPL-MCNC: 96 MG/DL — SIGNIFICANT CHANGE UP
HGB BLD-MCNC: 12.5 G/DL
HGB BLD-MCNC: 13 G/DL — SIGNIFICANT CHANGE UP (ref 11.5–15.5)
IMM GRANULOCYTES NFR BLD AUTO: 0.2 %
IMM GRANULOCYTES NFR BLD AUTO: 0.2 % — SIGNIFICANT CHANGE UP (ref 0–1.5)
INR BLD: 0.93 — SIGNIFICANT CHANGE UP (ref 0.88–1.16)
INR PPP: 0.93 RATIO
LIPID PNL WITH DIRECT LDL SERPL: 93 MG/DL — SIGNIFICANT CHANGE UP
LYMPHOCYTES # BLD AUTO: 1.05 K/UL — SIGNIFICANT CHANGE UP (ref 1–3.3)
LYMPHOCYTES # BLD AUTO: 12.8 % — LOW (ref 13–44)
LYMPHOCYTES # BLD AUTO: 2.15 K/UL
LYMPHOCYTES NFR BLD AUTO: 34.8 %
MAN DIFF?: NORMAL
MCHC RBC-ENTMCNC: 29.1 PG
MCHC RBC-ENTMCNC: 29.5 PG — SIGNIFICANT CHANGE UP (ref 27–34)
MCHC RBC-ENTMCNC: 31.3 GM/DL
MCHC RBC-ENTMCNC: 32.4 GM/DL — SIGNIFICANT CHANGE UP (ref 32–36)
MCV RBC AUTO: 90.9 FL — SIGNIFICANT CHANGE UP (ref 80–100)
MCV RBC AUTO: 93.2 FL
MONOCYTES # BLD AUTO: 0.03 K/UL — SIGNIFICANT CHANGE UP (ref 0–0.9)
MONOCYTES # BLD AUTO: 0.33 K/UL
MONOCYTES NFR BLD AUTO: 0.4 % — LOW (ref 2–14)
MONOCYTES NFR BLD AUTO: 5.3 %
NEUTROPHILS # BLD AUTO: 3.48 K/UL
NEUTROPHILS # BLD AUTO: 7.11 K/UL — SIGNIFICANT CHANGE UP (ref 1.8–7.4)
NEUTROPHILS NFR BLD AUTO: 56.4 %
NEUTROPHILS NFR BLD AUTO: 86.5 % — HIGH (ref 43–77)
NRBC # BLD: 0 /100 WBCS — SIGNIFICANT CHANGE UP (ref 0–0)
PLATELET # BLD AUTO: 337 K/UL
PLATELET # BLD AUTO: 342 K/UL — SIGNIFICANT CHANGE UP (ref 150–400)
POTASSIUM SERPL-MCNC: 4.3 MMOL/L — SIGNIFICANT CHANGE UP (ref 3.5–5.3)
POTASSIUM SERPL-SCNC: 4.3 MMOL/L — SIGNIFICANT CHANGE UP (ref 3.5–5.3)
POTASSIUM SERPL-SCNC: 4.6 MMOL/L
PROT SERPL-MCNC: 6.9 G/DL
PROT SERPL-MCNC: 7.7 G/DL — SIGNIFICANT CHANGE UP (ref 6–8.3)
PROTHROM AB SERPL-ACNC: 10.6 SEC — SIGNIFICANT CHANGE UP (ref 10–12.9)
PT BLD: 10.7 SEC
RBC # BLD: 4.29 M/UL
RBC # BLD: 4.41 M/UL — SIGNIFICANT CHANGE UP (ref 3.8–5.2)
RBC # FLD: 13.3 % — SIGNIFICANT CHANGE UP (ref 10.3–14.5)
RBC # FLD: 14.1 %
SODIUM SERPL-SCNC: 139 MMOL/L — SIGNIFICANT CHANGE UP (ref 135–145)
SODIUM SERPL-SCNC: 141 MMOL/L
TOTAL CHOLESTEROL/HDL RATIO MEASUREMENT: 2.1 RATIO — LOW (ref 3.3–7.1)
TRIGL SERPL-MCNC: 62 MG/DL — SIGNIFICANT CHANGE UP (ref 10–149)
WBC # BLD: 8.22 K/UL — SIGNIFICANT CHANGE UP (ref 3.8–10.5)
WBC # FLD AUTO: 6.17 K/UL
WBC # FLD AUTO: 8.22 K/UL — SIGNIFICANT CHANGE UP (ref 3.8–10.5)

## 2020-02-06 PROCEDURE — C1760: CPT

## 2020-02-06 PROCEDURE — C1769: CPT

## 2020-02-06 PROCEDURE — 80061 LIPID PANEL: CPT

## 2020-02-06 PROCEDURE — 85610 PROTHROMBIN TIME: CPT

## 2020-02-06 PROCEDURE — 93458 L HRT ARTERY/VENTRICLE ANGIO: CPT

## 2020-02-06 PROCEDURE — 80053 COMPREHEN METABOLIC PANEL: CPT

## 2020-02-06 PROCEDURE — 85025 COMPLETE CBC W/AUTO DIFF WBC: CPT

## 2020-02-06 PROCEDURE — 83036 HEMOGLOBIN GLYCOSYLATED A1C: CPT

## 2020-02-06 PROCEDURE — 93458 L HRT ARTERY/VENTRICLE ANGIO: CPT | Mod: 26

## 2020-02-06 PROCEDURE — 82553 CREATINE MB FRACTION: CPT

## 2020-02-06 PROCEDURE — 93010 ELECTROCARDIOGRAM REPORT: CPT

## 2020-02-06 PROCEDURE — 85730 THROMBOPLASTIN TIME PARTIAL: CPT

## 2020-02-06 PROCEDURE — 93005 ELECTROCARDIOGRAM TRACING: CPT

## 2020-02-06 PROCEDURE — C1887: CPT

## 2020-02-06 PROCEDURE — 36415 COLL VENOUS BLD VENIPUNCTURE: CPT

## 2020-02-06 PROCEDURE — 82962 GLUCOSE BLOOD TEST: CPT

## 2020-02-06 PROCEDURE — 82550 ASSAY OF CK (CPK): CPT

## 2020-02-06 PROCEDURE — C1894: CPT

## 2020-02-06 RX ORDER — DIPHENHYDRAMINE HCL 50 MG
50 CAPSULE ORAL ONCE
Refills: 0 | Status: COMPLETED | OUTPATIENT
Start: 2020-02-06 | End: 2020-02-06

## 2020-02-06 RX ORDER — DEXTROSE 50 % IN WATER 50 %
15 SYRINGE (ML) INTRAVENOUS ONCE
Refills: 0 | Status: DISCONTINUED | OUTPATIENT
Start: 2020-02-06 | End: 2020-02-06

## 2020-02-06 RX ORDER — GLUCAGON INJECTION, SOLUTION 0.5 MG/.1ML
1 INJECTION, SOLUTION SUBCUTANEOUS ONCE
Refills: 0 | Status: DISCONTINUED | OUTPATIENT
Start: 2020-02-06 | End: 2020-02-06

## 2020-02-06 RX ORDER — INSULIN LISPRO 100/ML
VIAL (ML) SUBCUTANEOUS ONCE
Refills: 0 | Status: COMPLETED | OUTPATIENT
Start: 2020-02-06 | End: 2020-02-06

## 2020-02-06 RX ORDER — DEXTROSE 50 % IN WATER 50 %
12.5 SYRINGE (ML) INTRAVENOUS ONCE
Refills: 0 | Status: DISCONTINUED | OUTPATIENT
Start: 2020-02-06 | End: 2020-02-06

## 2020-02-06 RX ORDER — DEXTROSE 50 % IN WATER 50 %
25 SYRINGE (ML) INTRAVENOUS ONCE
Refills: 0 | Status: DISCONTINUED | OUTPATIENT
Start: 2020-02-06 | End: 2020-02-06

## 2020-02-06 RX ORDER — SODIUM CHLORIDE 9 MG/ML
500 INJECTION INTRAMUSCULAR; INTRAVENOUS; SUBCUTANEOUS
Refills: 0 | Status: DISCONTINUED | OUTPATIENT
Start: 2020-02-06 | End: 2020-02-06

## 2020-02-06 RX ORDER — SODIUM CHLORIDE 9 MG/ML
1000 INJECTION, SOLUTION INTRAVENOUS
Refills: 0 | Status: DISCONTINUED | OUTPATIENT
Start: 2020-02-06 | End: 2020-02-06

## 2020-02-06 RX ORDER — CHLORHEXIDINE GLUCONATE 213 G/1000ML
1 SOLUTION TOPICAL ONCE
Refills: 0 | Status: DISCONTINUED | OUTPATIENT
Start: 2020-02-06 | End: 2020-02-06

## 2020-02-06 RX ORDER — CLOPIDOGREL BISULFATE 75 MG/1
600 TABLET, FILM COATED ORAL ONCE
Refills: 0 | Status: COMPLETED | OUTPATIENT
Start: 2020-02-06 | End: 2020-02-06

## 2020-02-06 RX ADMIN — Medication 50 MILLIGRAM(S): at 08:04

## 2020-02-06 RX ADMIN — CLOPIDOGREL BISULFATE 600 MILLIGRAM(S): 75 TABLET, FILM COATED ORAL at 07:59

## 2020-02-06 RX ADMIN — SODIUM CHLORIDE 75 MILLILITER(S): 9 INJECTION INTRAMUSCULAR; INTRAVENOUS; SUBCUTANEOUS at 08:01

## 2020-02-06 RX ADMIN — Medication 2: at 08:05

## 2020-02-06 NOTE — PROGRESS NOTE ADULT - SUBJECTIVE AND OBJECTIVE BOX
Procedure: LHC, Angioseal  Indication: UA, CAD, +CTA  Complication: none    Result:  1) Non-obstructive CAD  2) Normal LV function, EF 60%, LVEDP 8    Plan: Medical management.  D/C home - to f/u with Dr. Lehman.

## 2020-02-06 NOTE — PROGRESS NOTE ADULT - SUBJECTIVE AND OBJECTIVE BOX
Interventional Cardiology PA SDA Discharge Note    Patient without complaints. Ambulated and voided without difficulties    Afebrile, VSS    Ext:    		Right  Groin:  no hematoma, no  bruit, dressing; C/D/I      Pulses:    intact DP/PT to baseline     A/P:  74 year-old female [employer] with a PMH significant for DM, HLD, HTN, KYLE (compliant with BiPAP with O2 - unknown quantity), asthma/COPD (only on O2 at night, has been to ER for asthma - no hospitalizations/intubation), ILD (followed by Dr. 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 Lehman with complaints of SOB and substernal "tight" 10/10 chest pain with minimal exertion (making her bed - had to stop and rest), and in the shower, dizziness/pre-syncopal, fatigue. She has had rest pain. She reports seeing Pulm who felt that her pain was not explained by her lung disease. Denies any palpitations, syncope, LE edema, orthopnea, PND, fever, chills, or melena. CCTA 5/2/19: Ca score 140 (LAD 65, LCX 60, RCA 15, LM 0) - 66 percentile DELVALLE; Minimal stneosis in the distal LM, pLAD, pLCX, mLCX, and p/mRCA, EF 65%. Per MD note, ECHO 4/2019 LVEF 65%, mild LVH, trace to mild MR, LA 4.1 cm. Due to patient's risk factors, known CAD, and ccs class IV anginal symptoms, patient now presents to [facility] for Cardiac Catheterization with PCI as clinically indicated.      Pt now s/p diagnostic cardiac cath on 02/06/2020 revealing non-obstructive CAD. Normal LV function, EF 60%, LVEDP 8. Right groin AS.         1.	Stable for discharge as per attending Dr. Danielson after bed rest, pt voids, groin/wrist stable and 30 minutes of ambulation.  2.	Follow-up with PMD/Cardiologist Dr Lehman in 1-2 weeks  3.	Discharged forms signed and copies in chart

## 2020-02-10 ENCOUNTER — APPOINTMENT (OUTPATIENT)
Dept: HEART AND VASCULAR | Facility: CLINIC | Age: 75
End: 2020-02-10
Payer: MEDICARE

## 2020-02-10 VITALS
BODY MASS INDEX: 34.47 KG/M2 | WEIGHT: 175.56 LBS | DIASTOLIC BLOOD PRESSURE: 80 MMHG | HEART RATE: 84 BPM | SYSTOLIC BLOOD PRESSURE: 170 MMHG | HEIGHT: 60 IN

## 2020-02-10 PROBLEM — J84.9 INTERSTITIAL PULMONARY DISEASE, UNSPECIFIED: Chronic | Status: ACTIVE | Noted: 2020-02-05

## 2020-02-10 PROBLEM — J44.9 CHRONIC OBSTRUCTIVE PULMONARY DISEASE, UNSPECIFIED: Chronic | Status: ACTIVE | Noted: 2020-02-05

## 2020-02-10 PROBLEM — I47.2 VENTRICULAR TACHYCARDIA: Chronic | Status: ACTIVE | Noted: 2020-02-05

## 2020-02-10 PROBLEM — H26.9 UNSPECIFIED CATARACT: Chronic | Status: ACTIVE | Noted: 2020-02-05

## 2020-02-10 PROBLEM — C71.9 MALIGNANT NEOPLASM OF BRAIN, UNSPECIFIED: Chronic | Status: ACTIVE | Noted: 2020-02-05

## 2020-02-10 PROBLEM — G40.909 EPILEPSY, UNSPECIFIED, NOT INTRACTABLE, WITHOUT STATUS EPILEPTICUS: Chronic | Status: ACTIVE | Noted: 2020-02-05

## 2020-02-10 PROBLEM — Z86.79 PERSONAL HISTORY OF OTHER DISEASES OF THE CIRCULATORY SYSTEM: Chronic | Status: ACTIVE | Noted: 2020-02-05

## 2020-02-10 PROCEDURE — 93280 PM DEVICE PROGR EVAL DUAL: CPT

## 2020-02-10 NOTE — PHYSICAL EXAM
[Well Groomed] : well groomed [General Appearance - Well Developed] : well developed [Normal Appearance] : normal appearance [General Appearance - Well Nourished] : well nourished [No Deformities] : no deformities [General Appearance - In No Acute Distress] : no acute distress [Respiration, Rhythm And Depth] : normal respiratory rhythm and effort [Exaggerated Use Of Accessory Muscles For Inspiration] : no accessory muscle use [Auscultation Breath Sounds / Voice Sounds] : lungs were clear to auscultation bilaterally [Abdomen Soft] : soft [Abdomen Tenderness] : non-tender [Abdomen Mass (___ Cm)] : no abdominal mass palpated [Nail Clubbing] : no clubbing of the fingernails [Cyanosis, Localized] : no localized cyanosis [Petechial Hemorrhages (___cm)] : no petechial hemorrhages [Normal Conjunctiva] : the conjunctiva exhibited no abnormalities [Normal Oral Mucosa] : normal oral mucosa [Eyelids - No Xanthelasma] : the eyelids demonstrated no xanthelasmas [No Oral Pallor] : no oral pallor [No Oral Cyanosis] : no oral cyanosis [Normal Jugular Venous V Waves Present] : normal jugular venous V waves present [No Jugular Venous Chen A Waves] : no jugular venous chen A waves [Normal Jugular Venous A Waves Present] : normal jugular venous A waves present [5th Left ICS - MCL] : palpated at the 5th LICS in the midclavicular line [Normal] : normal [Rhythm Regular] : regular [Bradycardia] : bradycardic [No Pitting Edema] : no pitting edema present [No Murmur] : no murmurs heard [Gait - Sufficient For Exercise Testing] : the gait was sufficient for exercise testing [Abnormal Walk] : normal gait [] : no rash [Skin Color & Pigmentation] : normal skin color and pigmentation [No Venous Stasis] : no venous stasis [Skin Lesions] : no skin lesions [No Xanthoma] : no  xanthoma was observed [Oriented To Time, Place, And Person] : oriented to person, place, and time [No Skin Ulcers] : no skin ulcer [Mood] : the mood was normal [Affect] : the affect was normal [No Anxiety] : not feeling anxious

## 2020-02-11 ENCOUNTER — APPOINTMENT (OUTPATIENT)
Dept: HEART AND VASCULAR | Facility: CLINIC | Age: 75
End: 2020-02-11
Payer: MEDICARE

## 2020-02-11 PROCEDURE — 93296 REM INTERROG EVL PM/IDS: CPT

## 2020-02-11 PROCEDURE — 93294 REM INTERROG EVL PM/LDLS PM: CPT

## 2020-02-11 PROCEDURE — G2066: CPT

## 2020-02-11 NOTE — DISCUSSION/SUMMARY
[FreeTextEntry1] : Ms. Perez is a pleasant 75 year-old with recent ambulatory telemetry significant for junctional bradycardia during waking hours (~ 40 bpm) and brief episodes of pSVT.  Now s/p PPM placement 10/8/19.  Her device is functioning appropriately as programmed and all measured data is WNL.  No events for review.  She is enrolled in remote monitoring and was asked to return for follow-up in six months.

## 2020-02-11 NOTE — PROCEDURE
[No] : not [Sinus Bradycardia] : sinus bradycardia [Pacemaker] : pacemaker [DDD] : DDD [Normal] : The battery status is normal. [Lead Imp:  ___ohms] : lead impedance was [unfilled] ohms [Sensing Amplitude ___mv] : sensing amplitude was [unfilled] mv [___V @] : [unfilled] V [___ ms] : [unfilled] ms [de-identified] : [employer] [de-identified] : 10/8/19 [de-identified] : CLS [de-identified] : 60 [de-identified] : No events\par AP 95\par  0

## 2020-02-11 NOTE — HISTORY OF PRESENT ILLNESS
[FreeTextEntry1] : Ms. Perez is a pleasant 75 year-old female with a past medical history significant for KYLE (compliant with BIPAP), ILD (followed by Dr. 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  She presents for routine follow-up today.  She denies any recurrent dizziness post procedure.  Denies any presyncope or syncope.  Exercises in her bed - does 100 sit ups morning and night.  She complains of longstanding (years) of SOB with some ADLs such as making her bed that is unchanged recently.\par \par CTA 5/2/19 - nonobstructive CAD, LVEF 65%\par LTM (Zio XT) 7/30 - 8/12/19 SR (33-49-96), pSVT - longest 16 beats @ 112 bpm, symptomatic events within 45 seconds of SVT and junctional bradycardia\par Holter 6/2019 SR (37-52-90), longest R-R 2 seconds (after PAC), isolated PVCs, PACs\par ECHO 4/2019 LVEF 65%, mild LVH, trace to mild MR, LA 4.1 cm

## 2020-04-02 ENCOUNTER — APPOINTMENT (OUTPATIENT)
Dept: HEART AND VASCULAR | Facility: CLINIC | Age: 75
End: 2020-04-02

## 2020-05-05 ENCOUNTER — APPOINTMENT (OUTPATIENT)
Dept: HEART AND VASCULAR | Facility: CLINIC | Age: 75
End: 2020-05-05

## 2020-08-10 ENCOUNTER — APPOINTMENT (OUTPATIENT)
Dept: HEART AND VASCULAR | Facility: CLINIC | Age: 75
End: 2020-08-10
Payer: MEDICARE

## 2020-08-10 ENCOUNTER — APPOINTMENT (OUTPATIENT)
Dept: HEART AND VASCULAR | Facility: CLINIC | Age: 75
End: 2020-08-10

## 2020-08-10 PROCEDURE — 93280 PM DEVICE PROGR EVAL DUAL: CPT

## 2020-08-11 ENCOUNTER — APPOINTMENT (OUTPATIENT)
Dept: HEART AND VASCULAR | Facility: CLINIC | Age: 75
End: 2020-08-11
Payer: MEDICARE

## 2020-08-11 PROCEDURE — G2066: CPT

## 2020-08-11 PROCEDURE — 93296 REM INTERROG EVL PM/IDS: CPT

## 2020-08-11 PROCEDURE — 93294 REM INTERROG EVL PM/LDLS PM: CPT

## 2020-08-16 NOTE — REASON FOR VISIT
[Home] : at home, [unfilled] , at the time of the visit. [Medical Office: ([facility])___] : at the medical office located in  [Verbal consent obtained from patient] : the patient, [unfilled] [Follow-up Device Check] : follow-up device check visit

## 2020-08-16 NOTE — PROCEDURE
[No] : not [Sinus Bradycardia] : sinus bradycardia [Pacemaker] : pacemaker [DDD] : DDD [Normal] : The battery status is normal. [Lead Imp:  ___ohms] : lead impedance was [unfilled] ohms [Sensing Amplitude ___mv] : sensing amplitude was [unfilled] mv [___V @] : [unfilled] V [___ ms] : [unfilled] ms [Programmed for Longevity] : output reprogrammed for improved battery longevity [de-identified] : [employer] [de-identified] : 10/8/19 [de-identified] : CLS [de-identified] : 60

## 2020-08-16 NOTE — DISCUSSION/SUMMARY
[FreeTextEntry1] : Ms. Perez is a pleasant 75 year-old with recent ambulatory telemetry significant for junctional bradycardia during waking hours (~ 40 bpm) and brief episodes of pSVT.  Now s/p PPM placement 10/8/19.  Her device is functioning appropriately as programmed and all measured data is WNL.  She is enrolled in remote monitoring and was asked to return for follow-up in six months.

## 2020-08-16 NOTE — HISTORY OF PRESENT ILLNESS
[FreeTextEntry1] : Ms. Perez is a pleasant 75 year-old female with a past medical history significant for KYLE (compliant with BIPAP), ILD (followed by Dr. 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  She presents for follow-up today.  \par \par She denies any recurrent dizziness post procedure.  She offers no acute complaints.  \par  Denies any presyncope or syncope.  Exercises in her bed - does 100 sit ups morning and night.  Aware of brief palpitations that last for a few seconds, not particularly bothersome to her.\par \par CTA 5/2/19 - nonobstructive CAD, LVEF 65%\par LTM (Zio XT) 7/30 - 8/12/19 SR (33-49-96), pSVT - longest 16 beats @ 112 bpm, symptomatic events within 45 seconds of SVT and junctional bradycardia\par Holter 6/2019 SR (37-52-90), longest R-R 2 seconds (after PAC), isolated PVCs, PACs\par ECHO 4/2019 LVEF 65%, mild LVH, trace to mild MR, LA 4.1 cm

## 2020-11-04 ENCOUNTER — APPOINTMENT (OUTPATIENT)
Dept: HEART AND VASCULAR | Facility: CLINIC | Age: 75
End: 2020-11-04
Payer: MEDICARE

## 2020-11-04 VITALS
WEIGHT: 175 LBS | HEIGHT: 60 IN | TEMPERATURE: 97.4 F | SYSTOLIC BLOOD PRESSURE: 170 MMHG | OXYGEN SATURATION: 97 % | BODY MASS INDEX: 34.36 KG/M2 | RESPIRATION RATE: 14 BRPM | HEART RATE: 71 BPM | DIASTOLIC BLOOD PRESSURE: 82 MMHG

## 2020-11-04 DIAGNOSIS — R00.2 PALPITATIONS: ICD-10-CM

## 2020-11-04 DIAGNOSIS — G47.33 OBSTRUCTIVE SLEEP APNEA (ADULT) (PEDIATRIC): ICD-10-CM

## 2020-11-04 PROCEDURE — 99214 OFFICE O/P EST MOD 30 MIN: CPT

## 2020-11-04 PROCEDURE — 93000 ELECTROCARDIOGRAM COMPLETE: CPT

## 2020-11-04 NOTE — HISTORY OF PRESENT ILLNESS
[FreeTextEntry1] : 75 year female who saw Dr Perez recently who noted an enlarged heart on a CXR. She describes using CPAP and denies having any change in her pattern of her chest pain and SOB. We reviewed her cath results of February 2020. She denies having any difficulty lying flat, PND or orthopnea

## 2020-11-18 ENCOUNTER — APPOINTMENT (OUTPATIENT)
Dept: HEART AND VASCULAR | Facility: CLINIC | Age: 75
End: 2020-11-18
Payer: MEDICARE

## 2020-11-18 PROCEDURE — G2066: CPT

## 2020-11-18 PROCEDURE — 93294 REM INTERROG EVL PM/LDLS PM: CPT

## 2020-11-18 PROCEDURE — 93296 REM INTERROG EVL PM/IDS: CPT

## 2021-01-28 ENCOUNTER — APPOINTMENT (OUTPATIENT)
Dept: HEART AND VASCULAR | Facility: CLINIC | Age: 76
End: 2021-01-28

## 2021-02-02 ENCOUNTER — APPOINTMENT (OUTPATIENT)
Dept: HEART AND VASCULAR | Facility: CLINIC | Age: 76
End: 2021-02-02

## 2021-03-08 ENCOUNTER — APPOINTMENT (OUTPATIENT)
Dept: HEART AND VASCULAR | Facility: CLINIC | Age: 76
End: 2021-03-08
Payer: MEDICARE

## 2021-03-08 PROCEDURE — 93280 PM DEVICE PROGR EVAL DUAL: CPT

## 2021-03-11 NOTE — PROCEDURE
[No] : not [Sinus Bradycardia] : sinus bradycardia [Pacemaker] : pacemaker [DDD] : DDD [Normal] : The battery status is normal. [Lead Imp:  ___ohms] : lead impedance was [unfilled] ohms [Sensing Amplitude ___mv] : sensing amplitude was [unfilled] mv [___V @] : [unfilled] V [___ ms] : [unfilled] ms [Programmed for Longevity] : output reprogrammed for improved battery longevity [de-identified] : [employer] [de-identified] : 10/8/19 [de-identified] : CLS [de-identified] : 60

## 2021-03-11 NOTE — DISCUSSION/SUMMARY
[FreeTextEntry1] : Ms. Perez is a pleasant 76 year-old with recent ambulatory telemetry significant for junctional bradycardia during waking hours (~ 40 bpm) and brief episodes of pSVT.  Now s/p PPM placement 10/8/19.  Her device is functioning appropriately as programmed and all measured data is WNL.  She is enrolled in remote monitoring and was asked to return for follow-up in six months.

## 2021-03-11 NOTE — HISTORY OF PRESENT ILLNESS
[FreeTextEntry1] : Ms. Perez is a pleasant 75 year-old female with a past medical history significant for KYLE (compliant with BIPAP), ILD (followed by Dr. 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 \par \par She denies any recurrent dizziness post procedure.  She offers no acute complaints.  \par  Denies any presyncope or syncope.\par \par CTA 5/2/19 - nonobstructive CAD, LVEF 65%\par LTM (Zio XT) 7/30 - 8/12/19 SR (33-49-96), pSVT - longest 16 beats @ 112 bpm, symptomatic events within 45 seconds of SVT and junctional bradycardia\par Holter 6/2019 SR (37-52-90), longest R-R 2 seconds (after PAC), isolated PVCs, PACs\par ECHO 4/2019 LVEF 65%, mild LVH, trace to mild MR, LA 4.1 cm

## 2021-03-11 NOTE — REASON FOR VISIT
[Follow-up Device Check] : is here today for a follow-up device check visit for [Home] : at home, [unfilled] , at the time of the visit. [Medical Office: ([facility])___] : at the medical office located in  [Verbal consent obtained from patient] : the patient, [unfilled]

## 2021-05-26 NOTE — PATIENT PROFILE ADULT - HAS THE PATIENT EXPERIENCED ANY OF THE FOLLOWING WITHIN THE WEEK PRIOR TO ADMISSION?
[facility]    Labor Progress Note    Abida Jarrett Patient Status:  Inpatient    1991 MRN [id number]   Location [address]  Attending Roger Rick MD   [facility] Day # 2 PCP Franco To MD no

## 2021-06-18 ENCOUNTER — APPOINTMENT (OUTPATIENT)
Dept: HEART AND VASCULAR | Facility: CLINIC | Age: 76
End: 2021-06-18
Payer: MEDICARE

## 2021-06-18 VITALS
HEIGHT: 60 IN | DIASTOLIC BLOOD PRESSURE: 74 MMHG | BODY MASS INDEX: 32.79 KG/M2 | TEMPERATURE: 98 F | RESPIRATION RATE: 14 BRPM | WEIGHT: 167.01 LBS | HEART RATE: 67 BPM | SYSTOLIC BLOOD PRESSURE: 174 MMHG

## 2021-06-18 VITALS — DIASTOLIC BLOOD PRESSURE: 76 MMHG | SYSTOLIC BLOOD PRESSURE: 176 MMHG

## 2021-06-18 DIAGNOSIS — R07.89 OTHER CHEST PAIN: ICD-10-CM

## 2021-06-18 PROCEDURE — 93000 ELECTROCARDIOGRAM COMPLETE: CPT

## 2021-06-18 PROCEDURE — 36415 COLL VENOUS BLD VENIPUNCTURE: CPT

## 2021-06-18 PROCEDURE — 99214 OFFICE O/P EST MOD 30 MIN: CPT

## 2021-06-18 PROCEDURE — 93306 TTE W/DOPPLER COMPLETE: CPT

## 2021-06-18 NOTE — ASSESSMENT
[FreeTextEntry1] : At the time of the patient's visit an Echocardiogram was performed to evaluate LV function. At the time of the visit the results were reviewed with patient \par \par Chest pain, fatigue, SOB, hypertension, non-obstructive CAD, nonrheumatic MR--I contacted R to confirm plan for non contrast chest CT. I contacted Dr Gongora who is planning to see her in followup of her PPM. I asked her to add Amlodipine 5 mg daily and urged compliance with CPAP

## 2021-06-18 NOTE — HISTORY OF PRESENT ILLNESS
[FreeTextEntry1] : 76 year female who notes feeling daily symptoms of sudden onset of fatigue and chest pain. It interferes with her ability to perform household chores. Her prior complaint is sweating, dizziness, nausea, fatigue, SOB along with chest pain. Her symptoms start out as sweating. It can happen at rest or standing in the kitchen when preparing a cup of coffee. She notes fatigue for years that has been getting worse. She falls asleep during the day. She has sleep apnea and is using a CPAP. She feels a sore throat and dryness

## 2021-06-18 NOTE — PHYSICAL EXAM
[Normal S1, S2] : normal S1, S2 [Normal Gait] : normal gait [No Edema] : no edema [Moves all extremities] : moves all extremities [Normal] : alert and oriented, normal memory

## 2021-06-21 LAB
ALBUMIN SERPL ELPH-MCNC: 4.8 G/DL
ALP BLD-CCNC: 80 U/L
ALT SERPL-CCNC: 12 U/L
ANION GAP SERPL CALC-SCNC: 11 MMOL/L
AST SERPL-CCNC: 20 U/L
BASOPHILS # BLD AUTO: 0.1 K/UL
BASOPHILS NFR BLD AUTO: 2 %
BILIRUB SERPL-MCNC: 0.3 MG/DL
BUN SERPL-MCNC: 11 MG/DL
CALCIUM SERPL-MCNC: 10.3 MG/DL
CHLORIDE SERPL-SCNC: 104 MMOL/L
CO2 SERPL-SCNC: 28 MMOL/L
CREAT SERPL-MCNC: 0.83 MG/DL
EOSINOPHIL # BLD AUTO: 0.07 K/UL
EOSINOPHIL NFR BLD AUTO: 1.4 %
GLUCOSE SERPL-MCNC: 105 MG/DL
HCT VFR BLD CALC: 42.3 %
HGB BLD-MCNC: 12.7 G/DL
IMM GRANULOCYTES NFR BLD AUTO: 0.2 %
LYMPHOCYTES # BLD AUTO: 1.71 K/UL
LYMPHOCYTES NFR BLD AUTO: 35 %
MAGNESIUM SERPL-MCNC: 2 MG/DL
MAN DIFF?: NORMAL
MCHC RBC-ENTMCNC: 28.9 PG
MCHC RBC-ENTMCNC: 30 GM/DL
MCV RBC AUTO: 96.4 FL
MONOCYTES # BLD AUTO: 0.32 K/UL
MONOCYTES NFR BLD AUTO: 6.6 %
NEUTROPHILS # BLD AUTO: 2.67 K/UL
NEUTROPHILS NFR BLD AUTO: 54.8 %
NT-PROBNP SERPL-MCNC: 132 PG/ML
PLATELET # BLD AUTO: 353 K/UL
POTASSIUM SERPL-SCNC: 4.8 MMOL/L
PROT SERPL-MCNC: 7.3 G/DL
RBC # BLD: 4.39 M/UL
RBC # FLD: 15.1 %
SODIUM SERPL-SCNC: 143 MMOL/L
TSH SERPL-ACNC: 2.2 UIU/ML
WBC # FLD AUTO: 4.88 K/UL

## 2021-07-06 ENCOUNTER — APPOINTMENT (OUTPATIENT)
Dept: HEART AND VASCULAR | Facility: CLINIC | Age: 76
End: 2021-07-06
Payer: MEDICARE

## 2021-07-06 VITALS
HEART RATE: 77 BPM | SYSTOLIC BLOOD PRESSURE: 154 MMHG | TEMPERATURE: 97.2 F | HEIGHT: 60 IN | OXYGEN SATURATION: 97 % | BODY MASS INDEX: 32.79 KG/M2 | RESPIRATION RATE: 14 BRPM | WEIGHT: 167 LBS | DIASTOLIC BLOOD PRESSURE: 72 MMHG

## 2021-07-06 VITALS — SYSTOLIC BLOOD PRESSURE: 148 MMHG | DIASTOLIC BLOOD PRESSURE: 70 MMHG

## 2021-07-06 PROCEDURE — 99214 OFFICE O/P EST MOD 30 MIN: CPT

## 2021-07-06 NOTE — PHYSICAL EXAM
[Normal Conjunctiva] : normal conjunctiva [Clear Lung Fields] : clear lung fields [No Edema] : no edema [Moves all extremities] : moves all extremities [Normal] : alert and oriented, normal memory

## 2021-07-06 NOTE — HISTORY OF PRESENT ILLNESS
[FreeTextEntry1] : 76 year female who had a CT of her chest with Dr Jorgensen and sonogram with Dr Ball. She denies having any new complaints

## 2021-07-19 ENCOUNTER — APPOINTMENT (OUTPATIENT)
Dept: HEART AND VASCULAR | Facility: CLINIC | Age: 76
End: 2021-07-19
Payer: MEDICARE

## 2021-07-19 VITALS
HEART RATE: 79 BPM | BODY MASS INDEX: 32.79 KG/M2 | SYSTOLIC BLOOD PRESSURE: 147 MMHG | HEIGHT: 60 IN | WEIGHT: 167 LBS | DIASTOLIC BLOOD PRESSURE: 66 MMHG

## 2021-07-19 PROCEDURE — 99213 OFFICE O/P EST LOW 20 MIN: CPT | Mod: 25

## 2021-07-19 PROCEDURE — 93280 PM DEVICE PROGR EVAL DUAL: CPT

## 2021-07-19 RX ORDER — BUDESONIDE AND FORMOTEROL FUMARATE DIHYDRATE 160; 4.5 UG/1; UG/1
160-4.5 AEROSOL RESPIRATORY (INHALATION)
Refills: 0 | Status: ACTIVE | COMMUNITY

## 2021-07-19 RX ORDER — UMECLIDINIUM 62.5 UG/1
62.5 AEROSOL, POWDER ORAL
Refills: 0 | Status: DISCONTINUED | COMMUNITY
End: 2021-07-19

## 2021-07-19 NOTE — HISTORY OF PRESENT ILLNESS
[FreeTextEntry1] : Ms. Perez is a pleasant 76 year-old female with a past medical history significant for KYLE (compliant with BIPAP), ILD (followed by Dr. 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  She complains of longstanding fatigue that has worsened over the past year.  She also describes frequent episodes of dizziness, nausea, diaphoresis while making her bed or making coffee in the morning.  No matt syncope.\par \par She reports that she has a "brain tumor" that she has been seeing a neurologist for - ? pituitary.\par \par CTA 5/2/19 - nonobstructive CAD, LVEF 65%\par LTM (Zio XT) 7/30 - 8/12/19 SR (33-49-96), pSVT - longest 16 beats @ 112 bpm, symptomatic events within 45 seconds of SVT and junctional bradycardia\par Holter 6/2019 SR (37-52-90), longest R-R 2 seconds (after PAC), isolated PVCs, PACs\par ECHO 4/2019 LVEF 65%, mild LVH, trace to mild MR, LA 4.1 cm

## 2021-07-19 NOTE — PROCEDURE
[No] : not [Sinus Bradycardia] : sinus bradycardia [Pacemaker] : pacemaker [DDD] : DDD [Normal] : The battery status is normal. [Lead Imp:  ___ohms] : lead impedance was [unfilled] ohms [Sensing Amplitude ___mv] : sensing amplitude was [unfilled] mv [___V @] : [unfilled] V [___ ms] : [unfilled] ms [Programmed for Longevity] : output reprogrammed for improved battery longevity [de-identified] : [employer] [de-identified] : 10/8/19 [de-identified] : CLS [de-identified] : 60

## 2021-07-19 NOTE — DISCUSSION/SUMMARY
[FreeTextEntry1] : Ms. Perez is a pleasant 76 year-old with recent ambulatory telemetry significant for junctional bradycardia during waking hours (~ 40 bpm) and brief episodes of pSVT.  Now s/p PPM placement 10/8/19.  Her device is functioning appropriately as programmed and all measured data is WNL.  Given presyncopal symptoms, CLS response was reprogrammed from medium to high.  I also encouraged her to follow-up with her neurologist for further evaluation and management of her pituitary tumor.  She is enrolled in remote monitoring and was asked to return for follow-up in six months.

## 2021-10-13 ENCOUNTER — RESULT REVIEW (OUTPATIENT)
Age: 76
End: 2021-10-13

## 2021-10-26 ENCOUNTER — APPOINTMENT (OUTPATIENT)
Dept: HEART AND VASCULAR | Facility: CLINIC | Age: 76
End: 2021-10-26

## 2022-01-24 ENCOUNTER — APPOINTMENT (OUTPATIENT)
Dept: HEART AND VASCULAR | Facility: CLINIC | Age: 77
End: 2022-01-24
Payer: MEDICARE

## 2022-01-24 VITALS
DIASTOLIC BLOOD PRESSURE: 89 MMHG | SYSTOLIC BLOOD PRESSURE: 195 MMHG | HEART RATE: 82 BPM | HEIGHT: 60 IN | BODY MASS INDEX: 31.8 KG/M2 | WEIGHT: 162 LBS

## 2022-01-24 PROCEDURE — 93280 PM DEVICE PROGR EVAL DUAL: CPT

## 2022-01-25 NOTE — DISCUSSION/SUMMARY
[FreeTextEntry1] : Ms. Perez is a pleasant 76 year-old with recent ambulatory telemetry significant for junctional bradycardia during waking hours (~ 40 bpm) and brief episodes of pSVT.  Now s/p PPM placement 10/8/19.  Her device is functioning appropriately as programmed and all measured data is WNL.  I encouraged her to follow-up with ENT as well as her neurologist for further evaluation and management of her pituitary tumor.  She is enrolled in remote monitoring and was asked to return for follow-up in six months.

## 2022-01-25 NOTE — HISTORY OF PRESENT ILLNESS
[FreeTextEntry1] : Ms. Perez is a pleasant 76 year-old female with a past medical history significant for KYLE (compliant with BIPAP), ILD (followed by Dr. 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  She complains of longstanding fatigue that has worsened over the past year.  She also describes frequent episodes of dizziness and nausea that comes on without provocation.  CLS was increased at her last visit.  She continues Neuro and ENT work-up.  No matt syncope.\par \par She reports that she has a "brain tumor" that she has been seeing a neurologist for - ? pituitary.\par \par CTA 5/2/19 - nonobstructive CAD, LVEF 65%\par LTM (Zio XT) 7/30 - 8/12/19 SR (33-49-96), pSVT - longest 16 beats @ 112 bpm, symptomatic events within 45 seconds of SVT and junctional bradycardia\par Holter 6/2019 SR (37-52-90), longest R-R 2 seconds (after PAC), isolated PVCs, PACs\par ECHO 4/2019 LVEF 65%, mild LVH, trace to mild MR, LA 4.1 cm

## 2022-01-25 NOTE — PROCEDURE
[No] : not [Sinus Bradycardia] : sinus bradycardia [Pacemaker] : pacemaker [DDD] : DDD [Normal] : The battery status is normal. [Lead Imp:  ___ohms] : lead impedance was [unfilled] ohms [Sensing Amplitude ___mv] : sensing amplitude was [unfilled] mv [___V @] : [unfilled] V [___ ms] : [unfilled] ms [Programmed for Longevity] : output reprogrammed for improved battery longevity [de-identified] : [employer] [de-identified] : 10/8/19 [de-identified] : CLS [de-identified] : 59 [de-identified] : 6 y 9 mo [de-identified] : AP 93\par  0\par No events

## 2022-04-06 ENCOUNTER — NON-APPOINTMENT (OUTPATIENT)
Age: 77
End: 2022-04-06

## 2022-04-06 ENCOUNTER — APPOINTMENT (OUTPATIENT)
Dept: NEUROLOGY | Facility: CLINIC | Age: 77
End: 2022-04-06
Payer: MEDICARE

## 2022-04-06 VITALS — HEART RATE: 79 BPM | SYSTOLIC BLOOD PRESSURE: 190 MMHG | DIASTOLIC BLOOD PRESSURE: 82 MMHG

## 2022-04-06 VITALS — SYSTOLIC BLOOD PRESSURE: 186 MMHG | HEART RATE: 80 BPM | DIASTOLIC BLOOD PRESSURE: 84 MMHG

## 2022-04-06 VITALS
HEART RATE: 69 BPM | SYSTOLIC BLOOD PRESSURE: 215 MMHG | HEIGHT: 60 IN | DIASTOLIC BLOOD PRESSURE: 78 MMHG | OXYGEN SATURATION: 98 % | BODY MASS INDEX: 32.2 KG/M2 | TEMPERATURE: 97 F | WEIGHT: 164 LBS

## 2022-04-06 VITALS — DIASTOLIC BLOOD PRESSURE: 74 MMHG | SYSTOLIC BLOOD PRESSURE: 177 MMHG | HEART RATE: 81 BPM

## 2022-04-06 DIAGNOSIS — D49.7 NEOPLASM OF UNSPECIFIED BEHAVIOR OF ENDOCRINE GLANDS AND OTHER PARTS OF NERVOUS SYSTEM: ICD-10-CM

## 2022-04-06 PROCEDURE — 99204 OFFICE O/P NEW MOD 45 MIN: CPT

## 2022-04-07 LAB
FSH SERPL-MCNC: 61 IU/L
GH SERPL-MCNC: 0.49 NG/ML
LH SERPL-ACNC: 22.4 IU/L
PROLACTIN SERPL-MCNC: 8.8 NG/ML
TSH SERPL-ACNC: 3.4 UIU/ML

## 2022-04-07 NOTE — HISTORY OF PRESENT ILLNESS
[FreeTextEntry1] : Pt is a 77yoF with no PMH KYLE (compliant with BIPAP), ILD (followed by Dr. Preez), DM, HLD, HTN and symptomatic junctional bradycardia s/p PPM placement 10/8/19 here for initial evaluation. Her primary complaint is vertigo. It initially began 3-4 years ago, more mild. Over past 2 years it has become much more frequent. Occurs every single morning and then she continues to have more episodes throughout the day. She says episodes usually begin in the morning when she is standing getting ready, such as getting dressed or making her bed. It begin as an intense perspiring- covered in sweat, then she develops sensation of room spinning, near- syncope, palpitations, dyspnea, fatigue. When she goes to sit down she feels disequilibrium and off-balance. Usually takes about 40 minutes to fully resolve. She gets additional episodes later in the day. She has checked her BPs during these episodes and it is always WNL. BP well-controlled in general. She says there was no change in these episodes after PPM was placed. She denies ever having passed out. Also has frequent daily headaches, more frontal and temples, feels like sharp, does not feel better when she lays down. Has had visual disturbances- green blue flashing in both eyes not necessarily when she has headaches. Was told she has migraines. \par \par Of note, she reports that since the 1990s she has been experiencing generalized fatigue, which she [id number] to experience for many years. She had brain MRI just prior to covid and it revealed pituitary mass. SHe has never followed up with endocrinology or had repeat scans d/t the pandemic. She also reports "I have thyroid issue" but she is unclear what that is, and is not on any medicaiton for it. \par

## 2022-04-07 NOTE — ASSESSMENT
[FreeTextEntry1] : Hx of pituitary tumor, c/o frequentt lightheadedness episodes that stat in, mornings and occur multiple times per day. Hx of pituitary tumor that has not been followed... \par \par Plan: \par MRI with pituitary \par Hormone labs \par Refer to endo \par

## 2022-04-07 NOTE — PHYSICAL EXAM
[FreeTextEntry1] : The patient is alert and oriented x3, naming intact x3, repetition normal, follows three-step commands, and is able to participate fully in the history taking.\par Speech is normal with no evidence of dysarthria.\par Memory is intact: Immediate recall 3 out of 3, short-term 3 out of 3, remote memory intact\par Cranial nerves II through XII intact\par Motor exam: Upper and lower extremities 5 out of 5 power, normal tone. No abnormal movements noted.\par Sensory exam: Intact to light touch and pinprick. Romberg negative.\par Coordination and vestibular exam: Finger to nose intact, no evidence of truncal or appendicular ataxia. No evidence of nystagmus. no vestibular symptoms elicited with head turning during ambulation.\par Gait: Veers to left with eyes open, mildly ataxic. \par Reflexes: One to 2+ in upper and lower extremities. No pathological reflexes. Downgoing toes.\par

## 2022-04-08 ENCOUNTER — RESULT REVIEW (OUTPATIENT)
Age: 77
End: 2022-04-08

## 2022-04-11 LAB — ACTH SER-ACNC: 12.3 PG/ML

## 2022-04-25 LAB
CORTICOSTEROID BIND GLOBULIN: 2.5 MG/DL
CORTIS SERPL-MCNC: 6.5 UG/DL
CORTISOL, FREE: 0.21 UG/DL
PFCX: 3.2 %

## 2022-04-28 ENCOUNTER — OUTPATIENT (OUTPATIENT)
Dept: OUTPATIENT SERVICES | Facility: HOSPITAL | Age: 77
LOS: 1 days | End: 2022-04-28
Payer: MEDICARE

## 2022-04-28 ENCOUNTER — APPOINTMENT (OUTPATIENT)
Dept: HEART AND VASCULAR | Facility: CLINIC | Age: 77
End: 2022-04-28
Payer: MEDICARE

## 2022-04-28 ENCOUNTER — APPOINTMENT (OUTPATIENT)
Dept: MRI IMAGING | Facility: HOSPITAL | Age: 77
End: 2022-04-28
Payer: MEDICARE

## 2022-04-28 DIAGNOSIS — Z98.890 OTHER SPECIFIED POSTPROCEDURAL STATES: Chronic | ICD-10-CM

## 2022-04-28 DIAGNOSIS — Z95.0 PRESENCE OF CARDIAC PACEMAKER: Chronic | ICD-10-CM

## 2022-04-28 PROCEDURE — A9585: CPT

## 2022-04-28 PROCEDURE — 70553 MRI BRAIN STEM W/O & W/DYE: CPT | Mod: MG

## 2022-04-28 PROCEDURE — G1004: CPT

## 2022-04-28 PROCEDURE — 70553 MRI BRAIN STEM W/O & W/DYE: CPT | Mod: 26,MG

## 2022-04-28 PROCEDURE — 93280 PM DEVICE PROGR EVAL DUAL: CPT

## 2022-04-28 NOTE — DISCUSSION/SUMMARY
[FreeTextEntry1] : Normal functioning pacemaker pre/post MRI.  FOllow up with Dr. Gongora as scheduled

## 2022-04-28 NOTE — PROCEDURE
[No] : not [NSR] : normal sinus rhythm [Pacemaker] : pacemaker [DDD] : DDD [Threshold Testing Performed] : Threshold testing was performed [Lead Imp:  ___ohms] : lead impedance was [unfilled] ohms [Sensing Amplitude ___mv] : sensing amplitude was [unfilled] mv [___V @] : [unfilled] V [___ ms] : [unfilled] ms [de-identified] : [facility] [de-identified] : 60 [de-identified] : 7 year 7 months [de-identified] : AOO 70 in MRI and then changed back  [de-identified] : no events AP 92%  0%

## 2022-04-28 NOTE — PHYSICAL EXAM
[General Appearance - Well Developed] : well developed [Normal Appearance] : normal appearance [Well Groomed] : well groomed [General Appearance - Well Nourished] : well nourished [No Deformities] : no deformities [General Appearance - In No Acute Distress] : no acute distress [Heart Rate And Rhythm] : heart rate and rhythm were normal [Heart Sounds] : normal S1 and S2 [] : no respiratory distress [Respiration, Rhythm And Depth] : normal respiratory rhythm and effort [Exaggerated Use Of Accessory Muscles For Inspiration] : no accessory muscle use [Well-Healed] : well-healed

## 2022-04-28 NOTE — HISTORY OF PRESENT ILLNESS
[de-identified] : patient here for device reprogramming pre/post MRI.  No current cardiac or device related complaints.

## 2022-04-28 NOTE — REVIEW OF SYSTEMS
[Fever] : no fever [Chills] : no chills [Feeling Fatigued] : not feeling fatigued [SOB] : no shortness of breath [Syncope] : no syncope

## 2022-06-10 ENCOUNTER — APPOINTMENT (OUTPATIENT)
Dept: HEART AND VASCULAR | Facility: CLINIC | Age: 77
End: 2022-06-10

## 2022-06-10 VITALS
HEIGHT: 60 IN | TEMPERATURE: 97.3 F | RESPIRATION RATE: 14 BRPM | DIASTOLIC BLOOD PRESSURE: 74 MMHG | WEIGHT: 159 LBS | BODY MASS INDEX: 31.22 KG/M2 | OXYGEN SATURATION: 97 % | SYSTOLIC BLOOD PRESSURE: 154 MMHG | HEART RATE: 79 BPM

## 2022-06-10 DIAGNOSIS — R06.00 DYSPNEA, UNSPECIFIED: ICD-10-CM

## 2022-06-10 DIAGNOSIS — I34.0 NONRHEUMATIC MITRAL (VALVE) INSUFFICIENCY: ICD-10-CM

## 2022-06-10 PROCEDURE — 99214 OFFICE O/P EST MOD 30 MIN: CPT | Mod: 25

## 2022-06-10 PROCEDURE — 93000 ELECTROCARDIOGRAM COMPLETE: CPT

## 2022-06-10 PROCEDURE — 93306 TTE W/DOPPLER COMPLETE: CPT

## 2022-06-10 RX ORDER — AMLODIPINE BESYLATE 10 MG/1
10 TABLET ORAL DAILY
Qty: 90 | Refills: 1 | Status: DISCONTINUED | COMMUNITY
Start: 2021-06-18 | End: 2022-06-10

## 2022-06-10 RX ORDER — LOSARTAN POTASSIUM 100 MG/1
100 TABLET, FILM COATED ORAL DAILY
Qty: 90 | Refills: 1 | Status: ACTIVE | COMMUNITY
Start: 2019-06-18

## 2022-06-10 NOTE — HISTORY OF PRESENT ILLNESS
[FreeTextEntry1] : 77 year female who comes for Cardiology followup. Her amlodipine was stopped due to leg swelling. Her Losartan was increased to 100 mg from 50 mg daily. She has intermittent palpitations. She also has chest pain at other times when exerting herself.

## 2022-06-10 NOTE — ASSESSMENT
[FreeTextEntry1] : An EKG was performed to evaluate for arrhythmia and ischemia. \par \par At the time of the patient's visit an Echocardiogram was performed to evaluate LV function. At the time of the visit the results were reviewed with patient \par \par CAD, PPM, palpitations, chest pain--Trial of Imdur 60 mg daily for angina. Consider beta blocker only if OK with Pulmonary. Request EP to check PPM and see sooner if needed.

## 2022-06-10 NOTE — PHYSICAL EXAM
[Normal Conjunctiva] : normal conjunctiva [No Edema] : no edema [Moves all extremities] : moves all extremities [Normal] : alert and oriented, normal memory

## 2022-06-13 ENCOUNTER — NON-APPOINTMENT (OUTPATIENT)
Age: 77
End: 2022-06-13

## 2022-07-18 ENCOUNTER — APPOINTMENT (OUTPATIENT)
Dept: HEART AND VASCULAR | Facility: CLINIC | Age: 77
End: 2022-07-18

## 2022-07-18 VITALS
TEMPERATURE: 94 F | BODY MASS INDEX: 31.41 KG/M2 | HEART RATE: 80 BPM | HEIGHT: 60 IN | DIASTOLIC BLOOD PRESSURE: 81 MMHG | SYSTOLIC BLOOD PRESSURE: 189 MMHG | WEIGHT: 160 LBS

## 2022-07-18 PROCEDURE — 93280 PM DEVICE PROGR EVAL DUAL: CPT

## 2022-07-18 RX ORDER — NYSTATIN 100000 [USP'U]/ML
100000 SUSPENSION ORAL
Qty: 280 | Refills: 0 | Status: DISCONTINUED | COMMUNITY
Start: 2022-01-10 | End: 2022-07-18

## 2022-07-18 RX ORDER — FAMOTIDINE 20 MG/1
20 TABLET, FILM COATED ORAL
Qty: 180 | Refills: 0 | Status: DISCONTINUED | COMMUNITY
Start: 2021-12-09 | End: 2022-07-18

## 2022-07-21 ENCOUNTER — NON-APPOINTMENT (OUTPATIENT)
Age: 77
End: 2022-07-21

## 2022-08-01 NOTE — DISCUSSION/SUMMARY
[FreeTextEntry1] : Ms. Perez is a pleasant 77 year-old with junctional bradycardia during waking hours (~ 40 bpm) and brief episodes of pSVT.  Now s/p PPM placement 10/8/19.  Her device is functioning appropriately as programmed and all measured data is WNL. No arrhythmia events for review on interrogation.  She is enrolled in remote monitoring and was asked to return for follow-up in six months.

## 2022-08-01 NOTE — PROCEDURE
[No] : not [Sinus Bradycardia] : sinus bradycardia [Pacemaker] : pacemaker [DDD] : DDD [Normal] : The battery status is normal. [Threshold Testing Performed] : Threshold testing was performed [Lead Imp:  ___ohms] : lead impedance was [unfilled] ohms [Sensing Amplitude ___mv] : sensing amplitude was [unfilled] mv [___V @] : [unfilled] V [___ ms] : [unfilled] ms [Programmed for Longevity] : output reprogrammed for improved battery longevity [de-identified] : 3[employer] [de-identified] : 10/8/19 [de-identified] : CLS [de-identified] : 59 [de-identified] : 7 y 3 mo [de-identified] : AP 94\par  0\par No events

## 2022-08-01 NOTE — HISTORY OF PRESENT ILLNESS
[FreeTextEntry1] : Ms. Perez is a pleasant 77 year-old female with a past medical history significant for KYLE (compliant with BIPAP), ILD (followed by Dr. 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  Prior complaint of fatigue; CLS was increased with some improvement.  Also  complains of intermittent dizziness and nausea that comes on without provocation.  History of pituitary tumor; now being followed by Dr. Yan.  No presyncope/sycnope. \par \par \par CTA 5/2/19 - nonobstructive CAD, LVEF 65%\par LTM (Zio XT) 7/30 - 8/12/19 SR (33-49-96), pSVT - longest 16 beats @ 112 bpm, symptomatic events within 45 seconds of SVT and junctional bradycardia\par Holter 6/2019 SR (37-52-90), longest R-R 2 seconds (after PAC), isolated PVCs, PACs\par ECHO 4/2019 LVEF 65%, mild LVH, trace to mild MR, LA 4.1 cm

## 2022-10-10 ENCOUNTER — RX RENEWAL (OUTPATIENT)
Age: 77
End: 2022-10-10

## 2022-10-26 ENCOUNTER — APPOINTMENT (OUTPATIENT)
Dept: HEART AND VASCULAR | Facility: CLINIC | Age: 77
End: 2022-10-26

## 2023-01-04 ENCOUNTER — APPOINTMENT (OUTPATIENT)
Dept: NEUROLOGY | Facility: CLINIC | Age: 78
End: 2023-01-04

## 2023-01-12 ENCOUNTER — NON-APPOINTMENT (OUTPATIENT)
Age: 78
End: 2023-01-12

## 2023-01-12 ENCOUNTER — APPOINTMENT (OUTPATIENT)
Dept: HEART AND VASCULAR | Facility: CLINIC | Age: 78
End: 2023-01-12
Payer: MEDICARE

## 2023-01-12 PROCEDURE — 93296 REM INTERROG EVL PM/IDS: CPT

## 2023-01-12 PROCEDURE — 93294 REM INTERROG EVL PM/LDLS PM: CPT

## 2023-01-17 ENCOUNTER — NON-APPOINTMENT (OUTPATIENT)
Age: 78
End: 2023-01-17

## 2023-01-17 ENCOUNTER — APPOINTMENT (OUTPATIENT)
Dept: HEART AND VASCULAR | Facility: CLINIC | Age: 78
End: 2023-01-17
Payer: MEDICARE

## 2023-01-17 VITALS
WEIGHT: 161 LBS | DIASTOLIC BLOOD PRESSURE: 78 MMHG | TEMPERATURE: 98.5 F | HEIGHT: 60 IN | RESPIRATION RATE: 14 BRPM | BODY MASS INDEX: 31.61 KG/M2 | SYSTOLIC BLOOD PRESSURE: 188 MMHG

## 2023-01-17 VITALS — DIASTOLIC BLOOD PRESSURE: 80 MMHG | SYSTOLIC BLOOD PRESSURE: 144 MMHG

## 2023-01-17 DIAGNOSIS — I10 ESSENTIAL (PRIMARY) HYPERTENSION: ICD-10-CM

## 2023-01-17 PROCEDURE — 93000 ELECTROCARDIOGRAM COMPLETE: CPT

## 2023-01-17 PROCEDURE — 99214 OFFICE O/P EST MOD 30 MIN: CPT

## 2023-01-17 RX ORDER — AMLODIPINE BESYLATE 5 MG/1
5 TABLET ORAL DAILY
Qty: 1 | Refills: 1 | Status: ACTIVE | COMMUNITY
Start: 2023-01-17

## 2023-01-17 RX ORDER — ISOSORBIDE MONONITRATE 60 MG/1
60 TABLET, EXTENDED RELEASE ORAL
Qty: 90 | Refills: 1 | Status: ACTIVE | COMMUNITY
Start: 2022-06-10 | End: 1900-01-01

## 2023-01-17 NOTE — ASSESSMENT
[FreeTextEntry1] : An EKG was performed to evaluate for arrhythmia and ischemia.\par \par Hypertension--We discussed a goal of /80 or lower in the office. BP  above  goal. Take Imdur daily along with Amlodipine. I informed the patient that  I did not find a Cardiovascular contraindication to the proposed colonoscopy at this time. If BP remain elevated, consider diuretic\par \par CAD-- risk factor control. Dr Ball plans change Zocor to Atorvastatin 40 mg daily\par \par I encouraged continued risk factor reduction and gradual increase in aerobic activity as tolerated\par \par  31  minutes were spent discussing cardiac risk excluding procedure time \par

## 2023-01-17 NOTE — HISTORY OF PRESENT ILLNESS
[FreeTextEntry1] : 77 year female with CAD who comes for Cardiology evaluation prior to colonoscopy. She is performing 600 sit ups in her bed twice daily. She denies having any chest pain, SOB, BROWN, dizziness, palpitations, orthopnea, PND or syncope. She was restarted on Amlodipine 5 mg daily by Dr Ball. She stopped her Imdur months ago

## 2023-02-13 ENCOUNTER — APPOINTMENT (OUTPATIENT)
Dept: HEART AND VASCULAR | Facility: CLINIC | Age: 78
End: 2023-02-13
Payer: MEDICARE

## 2023-02-13 VITALS
HEART RATE: 70 BPM | TEMPERATURE: 97.8 F | DIASTOLIC BLOOD PRESSURE: 89 MMHG | BODY MASS INDEX: 25.9 KG/M2 | SYSTOLIC BLOOD PRESSURE: 207 MMHG | HEIGHT: 67 IN | WEIGHT: 165 LBS

## 2023-02-13 PROCEDURE — 93280 PM DEVICE PROGR EVAL DUAL: CPT

## 2023-02-14 NOTE — HISTORY OF PRESENT ILLNESS
[FreeTextEntry1] : Ms. Perez is a pleasant 78 year-old female with a past medical history significant for KYLE (compliant with BIPAP), ILD (followed by Dr. 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  Prior complaint of fatigue; CLS was increased with some improvement.  Also  complains of intermittent dizziness and nausea that comes on without provocation.  History of pituitary tumor; now being followed by Dr. Yan.  She offers no acute complaints today. \par \par SEE PACEART\par \par CTA 5/2/19 - nonobstructive CAD, LVEF 65%\par LTM (Zio XT) 7/30 - 8/12/19 SR (33-49-96), pSVT - longest 16 beats @ 112 bpm, symptomatic events within 45 seconds of SVT and junctional bradycardia\par Holter 6/2019 SR (37-52-90), longest R-R 2 seconds (after PAC), isolated PVCs, PACs\par ECHO 4/2019 LVEF 65%, mild LVH, trace to mild MR, LA 4.1 cm

## 2023-02-14 NOTE — DISCUSSION/SUMMARY
[FreeTextEntry1] : Ms. Perez is a pleasant 78 year-old with junctional bradycardia during waking hours (~ 40 bpm) and brief episodes of pSVT.  Now s/p PPM placement 10/8/19.  Her device is functioning appropriately as programmed and all measured data is WNL. She is enrolled in remote monitoring and was asked to return for follow-up in six months.

## 2023-02-14 NOTE — ADDENDUM
[FreeTextEntry1] : I, Wilber Gongora, hereby attest that the medical record entry for this patient accurately reflects signatures/notations that I made on the Date of Service in my capacity as an Attending Physician when I treated/diagnosed the above patient. I do hereby attest that this information is true, accurate and complete to the best of my knowledge.  I was present for the entire visit and supervised the entire visit and made/agree with the plan as outlined.\par

## 2023-02-21 ENCOUNTER — APPOINTMENT (OUTPATIENT)
Dept: HEART AND VASCULAR | Facility: CLINIC | Age: 78
End: 2023-02-21

## 2023-05-05 ENCOUNTER — APPOINTMENT (OUTPATIENT)
Dept: HEART AND VASCULAR | Facility: CLINIC | Age: 78
End: 2023-05-05
Payer: MEDICARE

## 2023-05-05 ENCOUNTER — NON-APPOINTMENT (OUTPATIENT)
Age: 78
End: 2023-05-05

## 2023-05-05 VITALS
SYSTOLIC BLOOD PRESSURE: 172 MMHG | WEIGHT: 165.99 LBS | BODY MASS INDEX: 26.05 KG/M2 | TEMPERATURE: 98.8 F | HEIGHT: 67 IN | DIASTOLIC BLOOD PRESSURE: 88 MMHG | HEART RATE: 117 BPM | OXYGEN SATURATION: 97 %

## 2023-05-05 DIAGNOSIS — Z01.810 ENCOUNTER FOR PREPROCEDURAL CARDIOVASCULAR EXAMINATION: ICD-10-CM

## 2023-05-05 DIAGNOSIS — I25.10 ATHEROSCLEROTIC HEART DISEASE OF NATIVE CORONARY ARTERY W/OUT ANGINA PECTORIS: ICD-10-CM

## 2023-05-05 PROCEDURE — 99214 OFFICE O/P EST MOD 30 MIN: CPT

## 2023-05-05 PROCEDURE — 93000 ELECTROCARDIOGRAM COMPLETE: CPT

## 2023-05-05 NOTE — HISTORY OF PRESENT ILLNESS
[FreeTextEntry1] : 78 year female who comes for Cardiology evaluation prior to surgery for Breast Cancer with Dr Melendez in [place] on June 1, 2023. She denies any chest pain,dizziness, palpitations, orthopnea, PND or syncope. She notes SOB when her asthma is triggered. She is able to exercise twice daily walking and running place, 600 situps and stretching without any symptom. She feels able to walk on a treadmill. We reviewed that cath of 2020 showed non-obstructive CAD. She reports having an Echo in December 2022 with Dr Ball and everything was OK

## 2023-05-05 NOTE — ASSESSMENT
[FreeTextEntry1] : An EKG was performed to evaluate for arrhythmia and ischemia.\par \par CAD-- to continue statin and return for a stress test (preop if possible)\par \par I informed the patient that  did not find a Cardiovascular contraindication to the proposed breast surgery at this time \par \par I encouraged continued risk factor reduction and gradual increase in aerobic activity as tolerated\par \par 32   minutes were spent discussing cardiac risk excluding procedure time

## 2023-05-19 ENCOUNTER — APPOINTMENT (OUTPATIENT)
Dept: HEART AND VASCULAR | Facility: CLINIC | Age: 78
End: 2023-05-19
Payer: MEDICARE

## 2023-05-19 ENCOUNTER — NON-APPOINTMENT (OUTPATIENT)
Age: 78
End: 2023-05-19

## 2023-05-19 PROCEDURE — 93296 REM INTERROG EVL PM/IDS: CPT

## 2023-05-19 PROCEDURE — 93294 REM INTERROG EVL PM/LDLS PM: CPT

## 2023-08-14 ENCOUNTER — APPOINTMENT (OUTPATIENT)
Dept: HEART AND VASCULAR | Facility: CLINIC | Age: 78
End: 2023-08-14
Payer: MEDICARE

## 2023-08-14 ENCOUNTER — NON-APPOINTMENT (OUTPATIENT)
Age: 78
End: 2023-08-14

## 2023-08-14 VITALS — HEIGHT: 67 IN | WEIGHT: 164 LBS | BODY MASS INDEX: 25.74 KG/M2 | HEART RATE: 72 BPM | TEMPERATURE: 97.1 F

## 2023-08-14 PROCEDURE — 93280 PM DEVICE PROGR EVAL DUAL: CPT

## 2023-08-14 NOTE — HISTORY OF PRESENT ILLNESS
[FreeTextEntry1] : Ms. Perez is a pleasant 78 year-old female with a past medical history significant for KYLE (compliant with BIPAP), ILD (followed by Dr. 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  Prior complaint of fatigue; CLS was increased with some improvement.  Also  complains of intermittent dizziness and nausea that comes on without provocation; none since last visit.  History of pituitary tumor; now being followed by Dr. Yan.  Reports h/o Breast CA s/p surgery, XRT and Chemotherapy (oral for 1 year).  She offers no acute complaints.    SEE PACEART  CTA 5/2/19 - nonobstructive CAD, LVEF 65% LTM (Zio XT) 7/30 - 8/12/19 SR (33-49-96), pSVT - longest 16 beats @ 112 bpm, symptomatic events within 45 seconds of SVT and junctional bradycardia Holter 6/2019 SR (37-52-90), longest R-R 2 seconds (after PAC), isolated PVCs, PACs ECHO 4/2019 LVEF 65%, mild LVH, trace to mild MR, LA 4.1 cm

## 2023-11-13 ENCOUNTER — APPOINTMENT (OUTPATIENT)
Dept: HEART AND VASCULAR | Facility: CLINIC | Age: 78
End: 2023-11-13
Payer: MEDICARE

## 2023-11-13 ENCOUNTER — NON-APPOINTMENT (OUTPATIENT)
Age: 78
End: 2023-11-13

## 2023-11-13 PROCEDURE — 93296 REM INTERROG EVL PM/IDS: CPT

## 2023-11-13 PROCEDURE — 93294 REM INTERROG EVL PM/LDLS PM: CPT

## 2023-11-21 NOTE — PATIENT PROFILE ADULT - DO YOU FEEL LIKE HURTING OTHERS?
Detail Level: Detailed Add [id number] Cpt? (Important Note: In 2017 The Use Of 46724 Is Being Tracked By Cms To Determine Future Global Period Reimbursement For Global Periods): yes Wound Evaluated By (Optional): Meryl Brooke NP Wound Diameter In Cm(Optional): 0 Wound Crusting?: clean Wound Granulation?: early Wound Evaluated By (Optional): Meryl Brooke NP Wound Color?: red Patient To Follow-Up With?: our clinic Follow Up Units (Optional): 6 Follow Up Time Frame (Optional): months no

## 2024-02-12 ENCOUNTER — APPOINTMENT (OUTPATIENT)
Dept: HEART AND VASCULAR | Facility: CLINIC | Age: 79
End: 2024-02-12

## 2024-05-13 ENCOUNTER — APPOINTMENT (OUTPATIENT)
Dept: HEART AND VASCULAR | Facility: CLINIC | Age: 79
End: 2024-05-13